# Patient Record
Sex: MALE | Race: WHITE | NOT HISPANIC OR LATINO | Employment: FULL TIME | URBAN - METROPOLITAN AREA
[De-identification: names, ages, dates, MRNs, and addresses within clinical notes are randomized per-mention and may not be internally consistent; named-entity substitution may affect disease eponyms.]

---

## 2017-01-23 ENCOUNTER — OFFICE VISIT (OUTPATIENT)
Dept: MEDICAL GROUP | Facility: MEDICAL CENTER | Age: 37
End: 2017-01-23
Payer: COMMERCIAL

## 2017-01-23 VITALS
TEMPERATURE: 98.4 F | OXYGEN SATURATION: 95 % | HEIGHT: 68 IN | BODY MASS INDEX: 29.83 KG/M2 | WEIGHT: 196.8 LBS | SYSTOLIC BLOOD PRESSURE: 108 MMHG | DIASTOLIC BLOOD PRESSURE: 74 MMHG | HEART RATE: 66 BPM

## 2017-01-23 DIAGNOSIS — R26.89 BALANCE PROBLEM: ICD-10-CM

## 2017-01-23 DIAGNOSIS — Z23 NEED FOR INFLUENZA VACCINATION: ICD-10-CM

## 2017-01-23 DIAGNOSIS — H91.93 BILATERAL HEARING LOSS: ICD-10-CM

## 2017-01-23 DIAGNOSIS — F41.9 ANXIETY: ICD-10-CM

## 2017-01-23 PROCEDURE — 99204 OFFICE O/P NEW MOD 45 MIN: CPT | Mod: 25 | Performed by: FAMILY MEDICINE

## 2017-01-23 PROCEDURE — 90688 IIV4 VACCINE SPLT 0.5 ML IM: CPT | Performed by: FAMILY MEDICINE

## 2017-01-23 ASSESSMENT — PATIENT HEALTH QUESTIONNAIRE - PHQ9: CLINICAL INTERPRETATION OF PHQ2 SCORE: 0

## 2017-01-23 NOTE — MR AVS SNAPSHOT
"        Iggy Malloy   2017 3:00 PM   Office Visit   MRN: [id number]    Department:  [place] [address]   Dept Phone:  [phone]    Description:  Male : 1980   Provider:  Santos Montalvo M.D.           Reason for Visit     Establish Care Enlarged lymph node on (R) neck//needs hearing evaluation >>constant ringing in the ear      Allergies as of 2017     Allergen Noted Reactions    Other Environmental 2017   Runny Nose    severe      You were diagnosed with     Bilateral hearing loss   [940844]       Balance problem   [524685]       Anxiety   [661460]       Need for influenza vaccination   [033783]         Vital Signs     Blood Pressure Pulse Temperature Height Weight Body Mass Index    108/74 mmHg 66 36.9 °C (98.4 °F) 1.727 m (5' 7.99\") 89.268 kg (196 lb 12.8 oz) 29.93 kg/m2    Oxygen Saturation Smoking Status                95% Former Smoker          Basic Information     Date Of Birth Sex Race Ethnicity Preferred Language    1980 Male White Non- English      Your appointments     2017  8:00 AM   Established Patient with Santos Montalvo M.D.   [facility]    [address]  [address]  [facility] [address] [phone]           You will be receiving a confirmation call a few days before your appointment from our automated call confirmation system.              Problem List              ICD-10-CM Priority Class Noted - Resolved    Bilateral hearing loss H91.93   2017 - Present    Balance problem R26.89   2017 - Present    Anxiety F41.9   2017 - Present      Health Maintenance        Date Due Completion Dates    IMM DTaP/Tdap/Td Vaccine (1 - Tdap) 3/17/2000 ---    IMM INFLUENZA (1) 2016 ---            Current Immunizations     Influenza Vaccine Quad Inj (Preserved)  Incomplete      Below and/or attached are the medications your provider expects you to take. Review all of your home " medications and newly ordered medications with your provider and/or pharmacist. Follow medication instructions as directed by your provider and/or pharmacist. Please keep your medication list with you and share with your provider. Update the information when medications are discontinued, doses are changed, or new medications (including over-the-counter products) are added; and carry medication information at all times in the event of emergency situations     Allergies:  OTHER ENVIRONMENTAL - Runny Nose               Medications  Valid as of: January 23, 2017 -  3:47 PM    Generic Name Brand Name Tablet Size Instructions for use    .                 Medicines prescribed today were sent to:     [facility][id number] - [place], [place] - [address]    [address] [place] [address]    Phone: [phone] Fax: [phone]    Open 24 Hours?: No      Medication refill instructions:       If your prescription bottle indicates you have medication refills left, it is not necessary to call your provider’s office. Please contact your pharmacy and they will refill your medication.    If your prescription bottle indicates you do not have any refills left, you may request refills at any time through one of the following ways: The online [employer] system (except Urgent Care), by calling your provider’s office, or by asking your pharmacy to contact your provider’s office with a refill request. Medication refills are processed only during regular business hours and may not be available until the next business day. Your provider may request additional information or to have a follow-up visit with you prior to refilling your medication.   *Please Note: Medication refills are assigned a new Rx number when refilled electronically. Your pharmacy may indicate that no refills were authorized even though a new prescription for the same medication is available at the pharmacy. Please request the medicine by name with the  pharmacy before contacting your provider for a refill.        Referral     A referral request has been sent to our patient care coordination department. Please allow 3-5 business days for us to process this request and contact you either by phone or mail. If you do not hear from us by the 5th business day, please call us at [phone].           Cuil Access Code: [email or web address]  Expires: 2/9/2017  9:08 AM    Cuil  A secure, online tool to manage your health information     [employer]’s Cuil® is a secure, online tool that connects you to your personalized health information from the privacy of your home -- day or night - making it very easy for you to manage your healthcare. Once the activation process is completed, you can even access your medical information using the Cuil triny, which is available for free in the Apple Triny store or Google Play store.     Cuil provides the following levels of access (as shown below):   My Chart Features   [facility] Primary Care Doctor [facility]  Specialists [facility]  Urgent  Care Non-[facility]  Primary Care  Doctor   Email your healthcare team securely and privately 24/7 X X X    Manage appointments: schedule your next appointment; view details of past/upcoming appointments X      Request prescription refills. X      View recent personal medical records, including lab and immunizations X X X X   View health record, including health history, allergies, medications X X X X   Read reports about your outpatient visits, procedures, consult and ER notes X X X X   See your discharge summary, which is a recap of your hospital and/or ER visit that includes your diagnosis, lab results, and care plan. X X       How to register for Cuil:  1. Go to  [email or web address].  2. Click on the Sign Up Now box, which takes you to the New Member Sign Up page. You will need to provide the following information:  a. Enter your Cuil Access Code exactly as it appears at the top of  this page. (You will not need to use this code after you’ve completed the sign-up process. If you do not sign up before the expiration date, you must request a new code.)   b. Enter your date of birth.   c. Enter your home email address.   d. Click Submit, and follow the next screen’s instructions.  3. Create a Kustom Codes ID. This will be your Kustom Codes login ID and cannot be changed, so think of one that is secure and easy to remember.  4. Create a K[employer] Codes password. You can change your password at any time.  5. Enter your Password Reset Question and Answer. This can be used at a later time if you forget your password.   6. Enter your e-mail address. This allows you to receive e-mail notifications when new information is available in Kustom Codes.  7. Click Sign Up. You can now view your health information.    For assistance activating your K[employer] Codes account, call [phone]

## 2017-01-23 NOTE — Clinical Note
[facility]  Santos Montalvo M.D.  [address]  [place] [address]  Fax: [phone] Authorization for Release/Disclosure of Protected Health Information   Name: IGGY MALLOY : 1980 SSN: [id number]   Address[address]  [address]  [place] [address] Phone:    [phone] (home)    I authorize the entity listed below to release/disclose the PHI below to [facility]/Santos Montalvo M.D.   Provider or Entity Name:       Address   City, State, Zip   Phone:      Fax:     Reason for request: continuity of care   Information to be released:    [  ] LAST COLONOSCOPY, including any PATH REPORT [  ] LAST DEXA  [  ] LAST MAMMOGRAM  [  ] LAST PAP [  ] RETINA EXAM REPORT  [  ] IMMUNIZATION RECORDS  [  ] Release all info      [  ] Check here and initial the line next to each item to release ALL health information INCLUDING  _____ Care and treatment for drug and / or alcohol abuse  _____ HIV testing, infection status, or AIDS  _____ Genetic Testing    DATES OF SERVICE OR TIME PERIOD TO BE DISCLOSED: _____________  I understand and acknowledge that:  * This Authorization may be revoked at any time by you in writing, except if your health information has already been used or disclosed.  * Your health information that will be used or disclosed as a result of you signing this authorization could be re-disclosed by the recipient. If this occurs, your re-disclosed health information may no longer be protected by State or Federal laws.  * You may refuse to sign this Authorization. Your refusal will not affect your ability to obtain treatment.  * This Authorization becomes effective upon signing and will  on (date) __________. If no date is indicated, this Authorization will  one (1) year from the signature date.    Name: Iggy Malloy    Signature:     Date: 2017

## 2017-01-27 NOTE — PROGRESS NOTES
Subjective:     Chief Complaint   Patient presents with   • Establish Care     Enlarged lymph node on (R) neck//needs hearing evaluation >>constant ringing in the ear       History of Present Illness:  Iggy Malloy is a 36 y.o. male patient new to [facility] who presents today to have evaluation for bilateral hearing loss as well as to establish primary medical care:    Bilateral hearing loss  Patient will proceed bilateral hearing loss and is equal bilaterally, associated with tinnitus as well as balance problem. Patient does report being exposed to loud sounds in the past.    Of note, patient denies any recent history of head trauma/accident/falls.    ROS negative for dizziness, headaches, blurred vision, ear fullness, sinus congestion, radicular pain down BUE or back, BUE weakness/numbness/paresthesias.    Balance problem  Please see notes from same date of service 01/23/2017 re: Bilateral hearing loss.    Anxiety  Patient with anxiety that he states is fairly well-controlled at present, doesn't feel the need to be referred to psychiatry nor psychology, nor start pharmacologic treatment at present.    ROS is currently NEGATIVE for generalized weakness/fatigue, palpitations, chest pain/pressure, diaphoresis, trembling/tremor, nausea, loose stools, fear of impending doom.      Patient Active Problem List    Diagnosis Date Noted   • Bilateral hearing loss 01/23/2017   • Balance problem 01/23/2017   • Anxiety 01/23/2017       Additional History:   Allergies:    Other environmental     Medications:     No current [facility]-ordered outpatient prescriptions on file.     No current [facility]-ordered facility-administered medications on file.        Past Medical History:   History reviewed. No pertinent past medical history.     Past Surgical History:   History reviewed. No pertinent past surgical history.     Social History:     Social History   Substance Use Topics   • Smoking status: Former Smoker -- 0.25 packs/day for 7  "years     Types: Cigarettes     Quit date: 2016   • Smokeless tobacco: Never Used   • Alcohol Use: No        Family History:     Family Status   Relation Status Death Age   • Mother  61     Alzheimer's   • Father Alive    • Sister Alive    • Maternal Grandmother     • Maternal Grandfather     • Paternal Grandmother     • Paternal Grandfather          Family History   Problem Relation Age of Onset   • Other Mother      Meniere's Disease   • Alzheimer's Disease Mother    • Cancer Mother      Skin Cancer   • No Known Problems Father    • No Known Problems Sister    • Cancer Maternal Grandmother      Throat CA   • Alcohol/Drug Maternal Grandmother      Smoker/Drinker   • Cancer Maternal Grandfather    • Alcohol/Drug Maternal Grandfather      Smoker/Drinker   • Alcohol/Drug Paternal Grandmother      Smoker/Drinker   • Cancer Paternal Grandfather      Stomach Cancer   • Alcohol/Drug Paternal Grandfather      Smoker/Drinker       ROS:     - ROS is NEGATIVE for dizziness, generalized weakness/fatigue, vision/hearing changes, jaw pain/paresthesias, BUE pain/paresthesias/numbness/weakness, chest pain/pressure, palpitations, dyspnea, RUQ abdominal pain, oliguria/anuria, BLE edema.    - Psychiatric/Behavioral: Negative for depression, suicidal/homicidal ideation and memory loss.      - NOTE: All other systems reviewed and are negative, except as in HPI.     Objective:   Physical Exam:    Vitals: Blood pressure 108/74, pulse 66, temperature 36.9 °C (98.4 °F), height 1.727 m (5' 7.99\"), weight 89.268 kg (196 lb 12.8 oz), SpO2 95 %.   BMI: Body mass index is 29.93 kg/(m^2).   General/Constitutional: Vitals as above, Well nourished, well developed male in no acute distress   Head/Eyes:  - Head is grossly normal & atraumatic  - Bilateral conjunctivae clear and not injected, bilateral EOMI, bilateral PERRL   ENT:   - Bilateral external ears grossly normal in appearance, external auditory " canals clear & bilateral TMs visualized with appropriate cone of light reflex, hearing grossly intact, liver testing does not lateralize to either ear, and bilateral Rinne testing reveals that her conduction is greater than bone conduction, although patient reports persistent tinnitus  - External nares normal in appearance and without discharge/bleeding, bilateral turbinates non-erythematous/non-edematous and without discharge/bleeding  - Good dentition,posterior oropharynx without erythema/edema/exudates  Neck: Neck supple, no masses   Respiratory: No respiratory distress, bilateral lungs are clear to auscultation in all lung fields (anterior/lateral/posterior), no wheezing/rhonchi/rales   Cardiovascular: Regular rate and rhythm without murmur/gallops/rubs, distal pulses equal and 2+ bilaterally (radial, posterior tibial), no bilateral lower extremity edema   MSK: Gait grossly normal & not antalgic, no tenderness to percussion of vertebral processes, no CVAT, no bilateral SI joint tenderness   Integumentary: No apparent rashes   Neuro: CN 2-12 tested and grossly intact, strength testing in upper and lower extremities is 5/5 and equal bilaterally, Gross motor movement intact in all 4 extremities, Gross sensation intact to extremities and trunk, Gait grossly normal and not antalgic, negative Romberg, negative Pronator drift   Psych: Judgment grossly appropriate, no apparent depression/anxiety    Assessment and Plan:   1. Bilateral hearing loss  2. Balance problem  Patient with bilateral hearing loss as well as gait imbalance with normal Rivas/Rinne testing.  This is suggestive of sensorineural hearing loss of both ears.  Referral to audiology for further details.   - REFERRAL TO AUDIOLOGY    3. Anxiety  Stable, well-controlled at present.  Will continue to monitor as needed.    4. Need for influenza vaccination   - INFLUENZA VACCINE QUAD INJECTION >3Y(PRESERVED)      PLEASE NOTE: This dictation was created using  voice recognition software. I have made every reasonable attempt to correct obvious errors, but I expect that there are errors of grammar and possibly content that I did not discover before finalizing the note.

## 2017-01-27 NOTE — ASSESSMENT & PLAN NOTE
Patient will proceed bilateral hearing loss and is equal bilaterally, associated with tinnitus as well as balance problem. Patient does report being exposed to loud sounds in the past.    Of note, patient denies any recent history of head trauma/accident/falls.    ROS negative for dizziness, headaches, blurred vision, ear fullness, sinus congestion, radicular pain down BUE or back, BUE weakness/numbness/paresthesias.

## 2017-01-27 NOTE — ASSESSMENT & PLAN NOTE
Patient with anxiety that he states is fairly well-controlled at present, doesn't feel the need to be referred to psychiatry nor psychology, nor start pharmacologic treatment at present.    ROS is currently NEGATIVE for generalized weakness/fatigue, palpitations, chest pain/pressure, diaphoresis, trembling/tremor, nausea, loose stools, fear of impending doom.

## 2017-02-28 ENCOUNTER — APPOINTMENT (OUTPATIENT)
Dept: MEDICAL GROUP | Facility: MEDICAL CENTER | Age: 37
End: 2017-02-28
Payer: COMMERCIAL

## 2017-09-13 ENCOUNTER — OFFICE VISIT (OUTPATIENT)
Dept: MEDICAL GROUP | Facility: MEDICAL CENTER | Age: 37
End: 2017-09-13
Payer: COMMERCIAL

## 2017-09-13 ENCOUNTER — HOSPITAL ENCOUNTER (OUTPATIENT)
Dept: LAB | Facility: MEDICAL CENTER | Age: 37
End: 2017-09-13
Attending: FAMILY MEDICINE
Payer: COMMERCIAL

## 2017-09-13 VITALS
TEMPERATURE: 97.8 F | HEART RATE: 77 BPM | OXYGEN SATURATION: 94 % | DIASTOLIC BLOOD PRESSURE: 68 MMHG | HEIGHT: 67 IN | RESPIRATION RATE: 12 BRPM | SYSTOLIC BLOOD PRESSURE: 108 MMHG | BODY MASS INDEX: 31.42 KG/M2 | WEIGHT: 200.18 LBS

## 2017-09-13 DIAGNOSIS — Z00.00 ROUTINE GENERAL MEDICAL EXAMINATION AT A HEALTH CARE FACILITY: ICD-10-CM

## 2017-09-13 DIAGNOSIS — Z13.1 DIABETES MELLITUS SCREENING: ICD-10-CM

## 2017-09-13 DIAGNOSIS — Z00.00 ANNUAL PHYSICAL EXAM: Primary | ICD-10-CM

## 2017-09-13 DIAGNOSIS — R26.89 BALANCE PROBLEM: ICD-10-CM

## 2017-09-13 DIAGNOSIS — Z13.6 ENCOUNTER FOR LIPID SCREENING FOR CARDIOVASCULAR DISEASE: ICD-10-CM

## 2017-09-13 DIAGNOSIS — Z13.220 ENCOUNTER FOR LIPID SCREENING FOR CARDIOVASCULAR DISEASE: ICD-10-CM

## 2017-09-13 DIAGNOSIS — Z00.00 ANNUAL PHYSICAL EXAM: ICD-10-CM

## 2017-09-13 PROBLEM — H91.93 BILATERAL HEARING LOSS: Status: RESOLVED | Noted: 2017-01-23 | Resolved: 2017-09-13

## 2017-09-13 PROCEDURE — 80061 LIPID PANEL: CPT

## 2017-09-13 PROCEDURE — 80053 COMPREHEN METABOLIC PANEL: CPT

## 2017-09-13 PROCEDURE — 36415 COLL VENOUS BLD VENIPUNCTURE: CPT

## 2017-09-13 PROCEDURE — 99395 PREV VISIT EST AGE 18-39: CPT | Performed by: FAMILY MEDICINE

## 2017-09-13 PROCEDURE — 83036 HEMOGLOBIN GLYCOSYLATED A1C: CPT

## 2017-09-14 LAB
ALBUMIN SERPL BCP-MCNC: 4.2 G/DL (ref 3.2–4.9)
ALBUMIN/GLOB SERPL: 1.8 G/DL
ALP SERPL-CCNC: 65 U/L (ref 30–99)
ALT SERPL-CCNC: 21 U/L (ref 2–50)
ANION GAP SERPL CALC-SCNC: 6 MMOL/L (ref 0–11.9)
AST SERPL-CCNC: 18 U/L (ref 12–45)
BILIRUB SERPL-MCNC: 1.7 MG/DL (ref 0.1–1.5)
BUN SERPL-MCNC: 11 MG/DL (ref 8–22)
CALCIUM SERPL-MCNC: 9.6 MG/DL (ref 8.5–10.5)
CHLORIDE SERPL-SCNC: 105 MMOL/L (ref 96–112)
CHOLEST SERPL-MCNC: 172 MG/DL (ref 100–199)
CO2 SERPL-SCNC: 27 MMOL/L (ref 20–33)
CREAT SERPL-MCNC: 0.96 MG/DL (ref 0.5–1.4)
EST. AVERAGE GLUCOSE BLD GHB EST-MCNC: 120 MG/DL
GFR SERPL CREATININE-BSD FRML MDRD: >60 ML/MIN/1.73 M 2
GLOBULIN SER CALC-MCNC: 2.4 G/DL (ref 1.9–3.5)
GLUCOSE SERPL-MCNC: 81 MG/DL (ref 65–99)
HBA1C MFR BLD: 5.8 % (ref 0–5.6)
HDLC SERPL-MCNC: 33 MG/DL
LDLC SERPL CALC-MCNC: 100 MG/DL
POTASSIUM SERPL-SCNC: 4.2 MMOL/L (ref 3.6–5.5)
PROT SERPL-MCNC: 6.6 G/DL (ref 6–8.2)
SODIUM SERPL-SCNC: 138 MMOL/L (ref 135–145)
TRIGL SERPL-MCNC: 196 MG/DL (ref 0–149)

## 2017-09-17 PROBLEM — R73.03 PREDIABETES: Status: ACTIVE | Noted: 2017-09-17

## 2017-09-17 PROBLEM — E78.2 MIXED DYSLIPIDEMIA: Status: ACTIVE | Noted: 2017-09-17

## 2017-09-21 NOTE — ASSESSMENT & PLAN NOTE
1) patient has tenderness in bilateral ears, as well as alternating ear popping sensation. Patient also has dizziness that sounds like it's provoked by nystagmus as his eyes are tracking movement when he walks into particular room at work. Please ask patient for more details.         2) Patient has had audiologic testing at [facility]'s audiology clinic within the last count, with hearing as type a tympanogram, and hearing within normal limits. Patient was offered VNG testing, and I have discussed with him briefly what that entails.     3) Patient has family medical history for Meniere's disease in his mother.    ROS is NEGATIVE for persistent vertigo/dizziness (it is triggered, as in history above)

## 2017-09-21 NOTE — PROGRESS NOTES
"Subjective:   Chief Complaint/History of Present Illness:  Iggy Malloy is a 37 y.o. male established patient who presents today for annual physical exam and to discuss balance issues, would like referral to ENT if indicated:    Balance problem     1) patient has tenderness in bilateral ears, as well as alternating ear popping sensation. Patient also has dizziness that sounds like it's provoked by nystagmus as his eyes are tracking movement when he walks into particular room at work. Please ask patient for more details.         2) Patient has had audiologic testing at [facility]'s audiology clinic within the last count, with hearing as type a tympanogram, and hearing within normal limits. Patient was offered VNG testing, and I have discussed with him briefly what that entails.     3) Patient has family medical history for Meniere's disease in his mother.    ROS is NEGATIVE for persistent vertigo/dizziness (it is triggered, as in history above)      Patient Active Problem List    Diagnosis Date Noted   • Prediabetes 09/17/2017   • Mixed dyslipidemia 09/17/2017   • Balance problem 01/23/2017   • Anxiety 01/23/2017       Additional History:   Allergies:    Other environmental     Current Medications:     No current outpatient prescriptions on file.     No current facility-administered medications for this visit.         Social History:     Social History   Substance Use Topics   • Smoking status: Former Smoker     Packs/day: 0.25     Years: 7.00     Types: Cigarettes     Quit date: 1/23/2016   • Smokeless tobacco: Never Used   • Alcohol use No       ROS:     - NOTE: All other systems reviewed and are negative, except as in HPI.     Objective:   Physical Exam:    Vitals: Blood pressure 108/68, pulse 77, temperature 36.6 °C (97.8 °F), resp. rate 12, height 1.702 m (5' 7\"), weight 90.8 kg (200 lb 2.8 oz), SpO2 94 %.   BMI: Body mass index is 31.35 kg/m².   General/Constitutional: Vitals as above, Well nourished, well " developed male in no acute distress   Head/Eyes: Head is grossly normal & atraumatic, bilateral conjunctivae clear and not injected, bilateral EOMI, bilateral PERRL   ENT: Bilateral external ears grossly normal in appearance, Hearing grossly intact, External nares normal in appearance and without discharge/bleeding   Respiratory: No respiratory distress, bilateral lungs are clear to ausculation in all lung fields (anterior/lateral/posterior), no wheezing/rhonchi/rales   Cardiovascular: Regular rate and rhythm without murmur/gallops/rubs, distal pulses are intact and equal bilaterally (radial, posterior tibial), no bilateral lower extremity edema   MSK: Gait grossly normal & not antalgic   Integumentary: No apparent rashes   Neuro: CN 2-12 tested and grossly intact, nystagmus not able to be elicited on testing of saccadic gaze & rapid shift of vision from one side to another, Gross motor movement intact in all 4 extremities, Gait grossly normal and not antalgic, no dysmetria on gross testing (finger to nose, heel to shin), no diadochokinesia on gross testing of rapid alternating movements (hands, feet), negative romberg   Psych: Judgment grossly appropriate, no apparent depression/anxiety    Health Maintenance:      - Declines flu vaccine    Assessment and Plan:   1. Annual physical exam  Unknown control of metabolic health. Labs as below to evaluate.  Additionally, see #2 below.   - HEMOGLOBIN A1C; Future   - LIPID PROFILE; Future   - COMP METABOLIC PANEL; Future    2. Balance problem  Uncontrolled, seems to be triggered by target-rich environment with increased visual stimuli that is rapidly moving.  Referral to ENT for further evaluation/management.   - REFERRAL TO ENT    3. Routine general medical examination at a health care facility  4. Diabetes mellitus screening  5. Encounter for lipid screening for cardiovascular disease   - HEMOGLOBIN A1C; Future   - LIPID PROFILE; Future   - COMP METABOLIC PANEL;  Future    RTC: in 1month for labs review.    PLEASE NOTE: This dictation was created using voice recognition software. I have made every reasonable attempt to correct obvious errors, but I expect that there are errors of grammar and possibly content that I did not discover before finalizing the note.

## 2017-10-16 ENCOUNTER — OFFICE VISIT (OUTPATIENT)
Dept: MEDICAL GROUP | Facility: MEDICAL CENTER | Age: 37
End: 2017-10-16
Payer: COMMERCIAL

## 2017-10-16 VITALS
HEART RATE: 75 BPM | DIASTOLIC BLOOD PRESSURE: 74 MMHG | HEIGHT: 67 IN | OXYGEN SATURATION: 95 % | BODY MASS INDEX: 30.61 KG/M2 | SYSTOLIC BLOOD PRESSURE: 120 MMHG | WEIGHT: 195 LBS | RESPIRATION RATE: 12 BRPM | TEMPERATURE: 99.2 F

## 2017-10-16 DIAGNOSIS — M75.81 RIGHT ROTATOR CUFF TENDINITIS: ICD-10-CM

## 2017-10-16 DIAGNOSIS — Z23 NEED FOR INFLUENZA VACCINATION: ICD-10-CM

## 2017-10-16 DIAGNOSIS — E66.9 OBESITY (BMI 30.0-34.9): ICD-10-CM

## 2017-10-16 DIAGNOSIS — R73.03 PREDIABETES: ICD-10-CM

## 2017-10-16 DIAGNOSIS — R26.89 BALANCE PROBLEM: ICD-10-CM

## 2017-10-16 DIAGNOSIS — E78.2 MIXED DYSLIPIDEMIA: ICD-10-CM

## 2017-10-16 PROCEDURE — 99214 OFFICE O/P EST MOD 30 MIN: CPT | Performed by: FAMILY MEDICINE

## 2017-10-16 ASSESSMENT — PAIN SCALES - GENERAL: PAINLEVEL: NO PAIN

## 2017-10-16 NOTE — PATIENT INSTRUCTIONS
[facility]   [address]  [place]  [address]  Phone: [phone]

## 2017-10-16 NOTE — ASSESSMENT & PLAN NOTE
Patient and I discussed recent labs (see below; low HDL) and that ASCVD risk is increased based on most recent lipid panel, current blood pressure (non-hypertensive, without medication), diabetes status (non-diabetic), and smoking status (non-smoker).    Patient and I then discussed necessary dietary changes to make to address dyslipidemia.  Patient verbalized understanding.    ROS is NEGATIVE for dizziness, generalized weakness/fatigue, vision/hearing changes, jaw pain/paresthesias, BUE pain/paresthesias/numbness/weakness, chest pain/pressure, palpitations, dyspnea, RUQ abdominal pain, oliguria/anuria, BLE edema.    Lab Results   Component Value Date/Time    CHOLSTRLTOT 172 09/13/2017 03:58 PM     (H) 09/13/2017 03:58 PM    HDL 33 (A) 09/13/2017 03:58 PM    TRIGLYCERIDE 196 (H) 09/13/2017 03:58 PM

## 2017-10-16 NOTE — ASSESSMENT & PLAN NOTE
Patient states that he is having less of an issue with balance problem. Patient states that part of this is due to reduction of his caffeine, in terms of coffee and soda. 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    Patient will schedule his ENT consultation with [facility].    ROS is NEGATIVE for confusion, altered mentation, word finding difficulty, memory loss, diplopia, visual scotomas, facial droop, dysarthria, dysphagia, hemiplegia, gait instability, new/abnormal paresthesias/numbness

## 2017-10-18 NOTE — ASSESSMENT & PLAN NOTE
Current problem, patient believes is secondary to previous injury he sustained during this time the . This can make a time, impeding his ability to lay on his right side at night, as well as cuddle with his wife. Overall, patient denies any refraction really radicular pain/weakness/numbness, right hand decreased vision, discoordination of right upper extremity, radicular pain down back.

## 2017-10-18 NOTE — ASSESSMENT & PLAN NOTE
Stable, well-controlled.  Currently asymptomatic.    ROS is NEGATIVE for blurred vision, polydipsia, polyuria, diaphoresis, palpitations, fatigue, irritability, flank pain, BLE paresthesias.

## 2017-10-18 NOTE — PROGRESS NOTES
Subjective:   Chief Complaint/History of Present Illness:  Iggy Malloy is a 37 y.o. male established patient who presents today to discuss management of conditions as listed below:    Balance problem  Patient states that he is having less of an issue with balance problem. Patient states that part of this is due to reduction of his caffeine, in terms of coffee and soda. 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    Patient will schedule his ENT consultation with [facility].    ROS is NEGATIVE for confusion, altered mentation, word finding difficulty, memory loss, diplopia, visual scotomas, facial droop, dysarthria, dysphagia, hemiplegia, gait instability, new/abnormal paresthesias/numbness    Mixed dyslipidemia  Patient and I discussed recent labs (see below; low HDL) and that ASCVD risk is increased based on most recent lipid panel, current blood pressure (non-hypertensive, without medication), diabetes status (non-diabetic), and smoking status (non-smoker).    Patient and I then discussed necessary dietary changes to make to address dyslipidemia.  Patient verbalized understanding.    ROS is NEGATIVE for dizziness, generalized weakness/fatigue, vision/hearing changes, jaw pain/paresthesias, BUE pain/paresthesias/numbness/weakness, chest pain/pressure, palpitations, dyspnea, RUQ abdominal pain, oliguria/anuria, BLE edema.    Lab Results   Component Value Date/Time    CHOLSTRLTOT 172 09/13/2017 03:58 PM     (H) 09/13/2017 03:58 PM    HDL 33 (A) 09/13/2017 03:58 PM    TRIGLYCERIDE 196 (H) 09/13/2017 03:58 PM         Right rotator cuff tendinitis  Current problem, patient believes is secondary to previous injury he sustained during this time the . This can make a time, impeding his ability to lay on his right side at night, as well as cuddle with his wife. Overall, patient denies any refraction really radicular pain/weakness/numbness, right hand decreased vision,  "discoordination of right upper extremity, radicular pain down back.    Obesity (BMI 30.0-34.9)  Uncontrolled, however patient has significant muscle mass. Patient and I discussed that he could likely still losing additional 10-15 pounds, and patient is in agreement.    ROS is NEGATIVE for blurred vision, polydipsia, polyuria, diaphoresis, palpitations, fatigue, irritability, flank pain, BLE paresthesias.    ROS is NEGATIVE for: cold or heat intolerance, anxiety/depression, chest pain/pressure, palpitations, hair thickening/coarsening/falling out/thinning, skin changes, diarrhea/constipation, unexpected weight change.    Prediabetes  Stable, well-controlled.  Currently asymptomatic.    ROS is NEGATIVE for blurred vision, polydipsia, polyuria, diaphoresis, palpitations, fatigue, irritability, flank pain, BLE paresthesias.      Patient Active Problem List    Diagnosis Date Noted   • Right rotator cuff tendinitis 10/16/2017   • Obesity (BMI 30.0-34.9) 10/16/2017   • Prediabetes 09/17/2017   • Mixed dyslipidemia 09/17/2017   • Balance problem 01/23/2017   • Anxiety 01/23/2017       Additional History:   Allergies:    Other environmental     Current Medications:     No current outpatient prescriptions on file.     No current facility-administered medications for this visit.         Social History:     Social History   Substance Use Topics   • Smoking status: Former Smoker     Packs/day: 0.25     Years: 7.00     Types: Cigarettes     Quit date: 1/23/2016   • Smokeless tobacco: Never Used   • Alcohol use No       ROS:     - NOTE: All other systems reviewed and are negative, except as in HPI.     Objective:   Physical Exam:    Vitals: Blood pressure 120/74, pulse 75, temperature 37.3 °C (99.2 °F), resp. rate 12, height 1.702 m (5' 7\"), weight 88.5 kg (195 lb), SpO2 95 %.   BMI: Body mass index is 30.54 kg/m².   General/Constitutional: Vitals as above, Well nourished, well developed male in no acute distress   Head/Eyes: Head " is grossly normal & atraumatic, bilateral conjunctivae clear and not injected, bilateral EOMI, bilateral PERRL   ENT: Bilateral external ears grossly normal in appearance, Hearing grossly intact, External nares normal in appearance and without discharge/bleeding   Respiratory: No respiratory distress, bilateral lungs are clear to ausculation in all lung fields (anterior/lateral/posterior), no wheezing/rhonchi/rales   Cardiovascular: Regular rate and rhythm without murmur/gallops/rubs, distal pulses are intact and equal bilaterally (radial, posterior tibial), no bilateral lower extremity edema   MSK: Gait grossly normal & not antalgic, POSITIVE exam of right rotator cuff strain/sprain [tenderness of rotator cuff on: internal & external rotation against resistance, abduction/adduction against resistance, subscapularis liftoff test], otherwise NEGATIVE exam of bilateral upper extremities (shoulder impingement [empty can, Hawkin's, Neer's], tenderness on abduction and adduction against resistance, strength testing 5/5 and equal bilaterally of biceps/triceps/)   Integumentary: No apparent rashes   Psych: Judgment grossly appropriate, no apparent depression/anxiety    Health Maintenance:      - Declines flu vaccination    Imaging/Labs:      - 09/14/17 -- Prediabetes (A1c 5.8%), mixed DLD (HDL 33, , TChol 172, ), CMP WNL    Assessment and Plan:   1. Balance problem  Uncontrolled.  Patient encouraged to follow-up with ENT Referral.    2. Right rotator cuff tendinitis  Uncontrolled.  Referral to PT for further evaluation/management.   - REFERRAL TO PHYSICAL THERAPY Reason for Therapy: Eval/Treat/Report    3. Mixed dyslipidemia  Uncontrolled.  Patient and I discussed the importance of lifestyle changes, with particular emphasis on plant-based nutrition (for the purposes of weight loss, general health, DLD, prediabetes), as well as cardiovascular exercise, proper sleep, and stress management.  This discussion  is briefly summarized in the patient instruction section below.  Patient verbalized understanding.    4. Prediabetes  Uncontrolled.  See lifestyle changes as in #3 above.    5. Obesity (BMI 30.0-34.9)  Uncontrolled.  Patient to work on further weight loss.   - Patient identified as having weight management issue.  Appropriate orders and counseling given.    6. Need for influenza vaccination  Uncontrolled. However patient declines vaccination present time. We discussed good hand hygiene practices as well as general infection control practices that patient can perform to decrease risk of influenza infection.        RTC: in 2 months for follow-up of R shoulder pain.    PLEASE NOTE: This dictation was created using voice recognition software. I have made every reasonable attempt to correct obvious errors, but I expect that there are errors of grammar and possibly content that I did not discover before finalizing the note.

## 2017-11-29 ENCOUNTER — PATIENT MESSAGE (OUTPATIENT)
Dept: MEDICAL GROUP | Facility: MEDICAL CENTER | Age: 37
End: 2017-11-29

## 2017-11-29 DIAGNOSIS — R26.89 BALANCE PROBLEM: ICD-10-CM

## 2017-11-29 NOTE — TELEPHONE ENCOUNTER
From: Iggy Malloy  To: Santos Montalvo M.D.  Sent: 11/29/2017 8:21 AM PST  Subject: Non-Urgent Medical Question     Hi, I wanted to know if I can get another referral for [facility], I waited too long and they don't have it anymore, was finally able to get there next week if possible,        Thanks, Bin Malloy

## 2017-12-05 ENCOUNTER — PATIENT MESSAGE (OUTPATIENT)
Dept: MEDICAL GROUP | Facility: MEDICAL CENTER | Age: 37
End: 2017-12-05

## 2017-12-07 NOTE — TELEPHONE ENCOUNTER
From: Iggy Malloy  To: Santos Montalvo M.D.  Sent: 12/5/2017 9:51 PM PST  Subject: Non-Urgent Medical Question    Hi Dr. Montalvo,     I have called [facility] several times, they don't answer, in which I have left two messages and never received a call back, or, if they do answer, transfer me to a voicemail inbox, is there any other place I can go? Thanks,      Bin Malloy

## 2017-12-11 ENCOUNTER — PATIENT MESSAGE (OUTPATIENT)
Dept: MEDICAL GROUP | Facility: MEDICAL CENTER | Age: 37
End: 2017-12-11

## 2017-12-18 ENCOUNTER — APPOINTMENT (OUTPATIENT)
Dept: MEDICAL GROUP | Facility: MEDICAL CENTER | Age: 37
End: 2017-12-18
Payer: COMMERCIAL

## 2018-01-15 ENCOUNTER — APPOINTMENT (OUTPATIENT)
Dept: MEDICAL GROUP | Facility: MEDICAL CENTER | Age: 38
End: 2018-01-15
Payer: COMMERCIAL

## 2018-01-30 ENCOUNTER — OFFICE VISIT (OUTPATIENT)
Dept: MEDICAL GROUP | Facility: MEDICAL CENTER | Age: 38
End: 2018-01-30
Payer: COMMERCIAL

## 2018-01-30 VITALS
HEART RATE: 99 BPM | DIASTOLIC BLOOD PRESSURE: 60 MMHG | OXYGEN SATURATION: 95 % | SYSTOLIC BLOOD PRESSURE: 98 MMHG | TEMPERATURE: 98.5 F | HEIGHT: 67 IN | BODY MASS INDEX: 31.83 KG/M2 | WEIGHT: 202.8 LBS

## 2018-01-30 DIAGNOSIS — R26.89 BALANCE PROBLEM: ICD-10-CM

## 2018-01-30 DIAGNOSIS — B95.8 STAPHYLOCOCCAL INFECTION OF SKIN: ICD-10-CM

## 2018-01-30 DIAGNOSIS — L08.9 STAPHYLOCOCCAL INFECTION OF SKIN: ICD-10-CM

## 2018-01-30 DIAGNOSIS — R73.03 PREDIABETES: ICD-10-CM

## 2018-01-30 DIAGNOSIS — E78.2 MIXED DYSLIPIDEMIA: ICD-10-CM

## 2018-01-30 PROCEDURE — 99214 OFFICE O/P EST MOD 30 MIN: CPT | Performed by: FAMILY MEDICINE

## 2018-01-30 RX ORDER — CEPHALEXIN 250 MG/1
500 CAPSULE ORAL 2 TIMES DAILY
Qty: 28 CAP | Refills: 0 | Status: SHIPPED | OUTPATIENT
Start: 2018-01-30 | End: 2018-02-06

## 2018-01-30 RX ORDER — DIAZEPAM 2 MG/1
TABLET ORAL
COMMUNITY
Start: 2018-01-10 | End: 2018-07-12

## 2018-01-30 ASSESSMENT — PATIENT HEALTH QUESTIONNAIRE - PHQ9: CLINICAL INTERPRETATION OF PHQ2 SCORE: 0

## 2018-02-03 NOTE — ASSESSMENT & PLAN NOTE
Chronic, stable, well-controlled, he believes that he is improving his diet over time.      ROS is NEGATIVE for blurred vision, polydipsia, polyuria, diaphoresis, palpitations, fatigue, irritability, flank pain, BLE paresthesias.

## 2018-02-03 NOTE — ASSESSMENT & PLAN NOTE
Chronic, unknown control, patient is changing his diet over time.    ROS is NEGATIVE for dizziness, generalized weakness/fatigue, cold sweats, dizziness,  vision/hearing changes, jaw pain/paresthesias, BUE pain/paresthesias/numbness/weakness, chest pain/pressure, palpitations, dyspnea, nausea, RUQ abdominal pain, oliguria/anuria, BLE edema.

## 2018-02-03 NOTE — PROGRESS NOTES
Subjective:   Chief Complaint/History of Present Illness:  Iggy Malloy is a 37 y.o. male established patient who presents today to discuss the following medical conditions:    Staphylococcal infection of skin  Patient has a new rash/skin infection on the right side of his face in his beard area/region.  Patient says that this has been pruritic, and has been spreading in distribution to have more lesions and be larger sizes.    Of note, since patient states that he practices [employer], and believes that he may have incurred this infection as results of this exercise.     ROS is negative for fevers, chills, purulent or bloody drainage.    Balance problem  Chronic, uncontrolled, however improving. Review of records reveals that he went to [facility] earlier this month, who informed him that he may have Ménière's disease. Therefore, patient has been placed on restricted salt diet. Patient states that he has been feeling less imbalance since that time, and verbalizes understanding that it is difficult to diagnose Ménière's disease, and that this is more of a disease of exclusion or a clinical diagnosis.    ROS is NEGATIVE for confusion, altered mentation, word finding difficulty, memory loss, diplopia, visual scotomas, facial droop, dysarthria, dysphagia, hemiplegia, gait instability, new/abnormal paresthesias/numbness.    Mixed dyslipidemia  Chronic, unknown control, patient is changing his diet over time.    ROS is NEGATIVE for dizziness, generalized weakness/fatigue, cold sweats, dizziness,  vision/hearing changes, jaw pain/paresthesias, BUE pain/paresthesias/numbness/weakness, chest pain/pressure, palpitations, dyspnea, nausea, RUQ abdominal pain, oliguria/anuria, BLE edema.    Prediabetes  Chronic, stable, well-controlled, he believes that he is improving his diet over time.      ROS is NEGATIVE for blurred vision, polydipsia, polyuria, diaphoresis, palpitations, fatigue, irritability, flank pain, BLE  "paresthesias.      Patient Active Problem List    Diagnosis Date Noted   • Staphylococcal infection of skin 01/30/2018   • Right rotator cuff tendinitis 10/16/2017   • Obesity (BMI 30.0-34.9) 10/16/2017   • Prediabetes 09/17/2017   • Mixed dyslipidemia 09/17/2017   • Balance problem 01/23/2017   • Anxiety 01/23/2017       Additional History:   Allergies:    Other environmental     Current Medications:     Current Outpatient Prescriptions   Medication Sig Dispense Refill   • diazepam (VALIUM) 2 MG Tab      • cephALEXin (KEFLEX) 250 MG Cap Take 2 Caps by mouth 2 times a day for 7 days. 28 Cap 0     No current facility-administered medications for this visit.         Social History:     Social History   Substance Use Topics   • Smoking status: Former Smoker     Packs/day: 0.25     Years: 7.00     Types: Cigarettes     Quit date: 1/23/2016   • Smokeless tobacco: Never Used   • Alcohol use No       ROS:     - NOTE: All other systems reviewed and are negative, except as in HPI.     Objective:   Physical Exam:   Vitals: Blood pressure (!) 98/60, pulse 99, temperature 36.9 °C (98.5 °F), height 1.702 m (5' 7\"), weight 92 kg (202 lb 12.8 oz), SpO2 95 %.   BMI: Body mass index is 31.76 kg/m².   General/Constitutional: Vitals as above, Well nourished, well developed male in no acute distress   Head/Eyes: Head is grossly normal & atraumatic, bilateral conjunctivae clear and not injected, bilateral EOMI, bilateral PERRL   ENT: Bilateral external ears grossly normal in appearance, Hearing grossly intact, External nares normal in appearance and without discharge/bleeding   Respiratory: No respiratory distress, bilateral lungs are clear to ausculation in all lung fields (anterior/lateral/posterior), no wheezing/rhonchi/rales   Cardiovascular: Regular rate and rhythm without murmur/gallops/rubs, distal pulses are intact and equal bilaterally (radial, posterior tibial), no bilateral lower extremity edema   MSK: Gait grossly normal & " "not antalgic   Integumentary: Stage 1-stage 2 ulcerations over the right side of his cheek/jawline without purulent drainage that are \"honey-colored,\" No apparent rashes   Psych: Judgment grossly appropriate, no apparent depression/anxiety    Health Maintenance:     - Not addressed at this visit    Imaging/Labs:     - Not addressed at this visit    Assessment and Plan:   1. Staphylococcal infection of skin  Acute, new problem, patient to take Keflex to treat this condition.   - cephALEXin (KEFLEX) 250 MG Cap; Take 2 Caps by mouth 2 times a day for 7 days.  Dispense: 28 Cap; Refill: 0    2. Balance problem  Chronic, uncontrolled, patient to follow-up with ENT.  Continue low-salt diet.    3. Mixed dyslipidemia  Chronic, uncontrolled, continue with lifestyle changes. Evaluate with labs as below.   - LIPID PROFILE; Future    4. Pred iabetes  Chronic, uncontrolled, continue with lifestyle changes. Evaluate with labs as below.   - HEMOGLOBIN A1C; Future        RTC: in 09/2018 re: Annual Medical exam.    PLEASE NOTE: This dictation was created using voice recognition software. I have made every reasonable attempt to correct obvious errors, but I expect that there are errors of grammar and possibly content that I did not discover before finalizing the note.  "

## 2018-02-03 NOTE — ASSESSMENT & PLAN NOTE
Patient has a new rash/skin infection on the right side of his face in his beard area/region.  Patient says that this has been pruritic, and has been spreading in distribution to have more lesions and be larger sizes.    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     ROS is negative for fevers, chills, purulent or bloody drainage.

## 2018-02-03 NOTE — ASSESSMENT & PLAN NOTE
Chronic, uncontrolled, however improving. Review of records reveals that he went to [facility] earlier this month, who informed him that he may have Ménière's disease. Therefore, patient has been placed on restricted salt diet. Patient states that he has been feeling less imbalance since that time, and verbalizes understanding that it is difficult to diagnose Ménière's disease, and that this is more of a disease of exclusion or a clinical diagnosis.    ROS is NEGATIVE for confusion, altered mentation, word finding difficulty, memory loss, diplopia, visual scotomas, facial droop, dysarthria, dysphagia, hemiplegia, gait instability, new/abnormal paresthesias/numbness.

## 2018-02-13 ENCOUNTER — OFFICE VISIT (OUTPATIENT)
Dept: MEDICAL GROUP | Facility: MEDICAL CENTER | Age: 38
End: 2018-02-13
Payer: COMMERCIAL

## 2018-02-13 VITALS
OXYGEN SATURATION: 95 % | SYSTOLIC BLOOD PRESSURE: 110 MMHG | HEART RATE: 71 BPM | WEIGHT: 207.23 LBS | BODY MASS INDEX: 32.53 KG/M2 | TEMPERATURE: 97.8 F | HEIGHT: 67 IN | DIASTOLIC BLOOD PRESSURE: 72 MMHG

## 2018-02-13 DIAGNOSIS — L08.9 SKIN INFECTION: ICD-10-CM

## 2018-02-13 PROCEDURE — 99214 OFFICE O/P EST MOD 30 MIN: CPT | Performed by: INTERNAL MEDICINE

## 2018-02-13 RX ORDER — CLINDAMYCIN HYDROCHLORIDE 300 MG/1
300 CAPSULE ORAL 3 TIMES DAILY
Qty: 30 CAP | Refills: 0 | Status: SHIPPED | OUTPATIENT
Start: 2018-02-13 | End: 2018-03-28

## 2018-02-13 NOTE — PROGRESS NOTES
CHIEF COMPLAINT  Chief Complaint   Patient presents with   • Wound Infection     inection on skin / Face     HPI  Patient is a 37 y.o. male patient who presents today for the following     Skin infection  The patient had episode of skin infection 3 weeks ago, on the right side of the face.     He was evaluated by Dr Montalvo in our office on 1/30/18, note was reviewed:  - Diagnosed as staph infection  - Given Keflex by mouth for 7 days    Skin infection, resolved and came back after a few days and gradually worsening, on the same place.     Risk: wrestling     Denies:  - Fever, chills  - Pain  - Numbness, tingling    Reviewed PMH, PSH, FH, SH, ALL, HCM/IMM, no changes  Reviewed MEDS, no changes    Patient Active Problem List    Diagnosis Date Noted   • Staphylococcal infection of skin 01/30/2018   • Right rotator cuff tendinitis 10/16/2017   • Obesity (BMI 30.0-34.9) 10/16/2017   • Prediabetes 09/17/2017   • Mixed dyslipidemia 09/17/2017   • Balance problem 01/23/2017   • Anxiety 01/23/2017     CURRENT MEDICATIONS  Current Outpatient Prescriptions   Medication Sig Dispense Refill   • clindamycin (CLEOCIN) 300 MG Cap Take 1 Cap by mouth 3 times a day. 30 Cap 0   • mupirocin (BACTROBAN) 2 % Ointment Apply 1 Application to affected area(s) every day. 1 Tube 1   • diazepam (VALIUM) 2 MG Tab        No current facility-administered medications for this visit.      ALLERGIES  Allergies: Other environmental  PAST MEDICAL HISTORY  No past medical history on file.  SURGICAL HISTORY  He  has no past surgical history on file.  SOCIAL HISTORY  Social History   Substance Use Topics   • Smoking status: Former Smoker     Packs/day: 0.25     Years: 7.00     Types: Cigarettes     Quit date: 1/23/2016   • Smokeless tobacco: Never Used   • Alcohol use No     Social History     Social History Narrative   • No narrative on file     FAMILY HISTORY  Family History   Problem Relation Age of Onset   • Other Mother      Meniere's Disease   •  "Alzheimer's Disease Mother    • Cancer Mother      Skin Cancer   • No Known Problems Father    • No Known Problems Sister    • Cancer Maternal Grandmother      Throat CA   • Alcohol/Drug Maternal Grandmother      Smoker/Drinker   • Cancer Maternal Grandfather    • Alcohol/Drug Maternal Grandfather      Smoker/Drinker   • Alcohol/Drug Paternal Grandmother      Smoker/Drinker   • Cancer Paternal Grandfather      Stomach Cancer   • Alcohol/Drug Paternal Grandfather      Smoker/Drinker     Family Status   Relation Status   • Mother  at age 61    Alzheimer's   • Father Alive   • Sister Alive   • Maternal Grandmother    • Maternal Grandfather    • Paternal Grandmother    • Paternal Grandfather      ROS   Constitutional: Negative for fever, chills.  HENT: Negative for congestion, sore throat.  Eyes: Negative for blurred vision.   Respiratory: Negative for cough, shortness of breath.  Cardiovascular: Negative for chest pain, palpitations.   Gastrointestinal: Negative for heartburn, nausea, abdominal pain.   Musculoskeletal: Negative for muscle pain..   Skin: Negative for rash and itching.   Neuro: Negative for weakness and headache.   Endo/Heme/Allergies: Does not bruise/bleed easily.   Psychiatric/Behavioral: Negative for depression    PHYSICAL EXAM   Blood pressure 110/72, pulse 71, temperature 36.6 °C (97.8 °F), height 1.702 m (5' 7\"), weight 94 kg (207 lb 3.7 oz), SpO2 95 %. Body mass index is 32.46 kg/m².  General:  NAD, well appearing  HEENT:   NC/AT, PERRLA, EOMI, TMs are clear. Oropharyngeal mucosa is pink,  without lesions;  no cervical / supraclavicular  lymphadenopathy, no thyromegaly.    Cardiovascular: RRR.   No m/r/g. No carotid bruits .      Lungs:   CTAB, no w/r/r, no respiratory distress.  Abdomen: Soft, NT/ND + BS; no suprapubic tenderness; no masses or hepatosplenomegaly.  Extremities:  2+ DP and radial pulses bilaterally.  No c/c/e.   Skin:  Warm, dry.  "   Erythematosus, nontender rash on the right side of the face.  Neurologic: Alert & oriented x 3. No focal deficits.  Psychiatric:  Affect normal, mood normal, judgment normal.    LABS     Labs are reviewed and discussed with a patient  Lab Results   Component Value Date/Time    CHOLSTRLTOT 172 09/13/2017 03:58 PM     (H) 09/13/2017 03:58 PM    HDL 33 (A) 09/13/2017 03:58 PM    TRIGLYCERIDE 196 (H) 09/13/2017 03:58 PM       Lab Results   Component Value Date/Time    SODIUM 138 09/13/2017 03:58 PM    POTASSIUM 4.2 09/13/2017 03:58 PM    CHLORIDE 105 09/13/2017 03:58 PM    CO2 27 09/13/2017 03:58 PM    GLUCOSE 81 09/13/2017 03:58 PM    BUN 11 09/13/2017 03:58 PM    CREATININE 0.96 09/13/2017 03:58 PM     Lab Results   Component Value Date/Time    ALKPHOSPHAT 65 09/13/2017 03:58 PM    ASTSGOT 18 09/13/2017 03:58 PM    ALTSGPT 21 09/13/2017 03:58 PM    TBILIRUBIN 1.7 (H) 09/13/2017 03:58 PM      Lab Results   Component Value Date/Time    HBA1C 5.8 (H) 09/13/2017 03:58 PM     IMAGING     None    ASSESMENT AND PLAN        1. Skin infection  - clindamycin (CLEOCIN) 300 MG Cap; Take 1 Cap by mouth 3 times a day.  Dispense: 30 Cap; Refill: 0  - mupirocin (BACTROBAN) 2 % Ointment; Apply 1 Application to affected area(s) every day.  Dispense: 1 Tube; Refill: 1    Advised to take abx after a meal.   Side effects of abx use discussed with a patient. Advised to stop abx and call if develop any side effect, including diarrhea.     Advised sanitation of the training/exercise area, hygiene practices.    Counseling:   - Smoking:  Nonsmoker    Followup: Return if symptoms worsen or fail to improve.    All questions are answered.    Please note that this dictation was created using voice recognition software, and that there might be errors of melecio and possibly content.

## 2018-02-22 ENCOUNTER — OFFICE VISIT (OUTPATIENT)
Dept: MEDICAL GROUP | Facility: MEDICAL CENTER | Age: 38
End: 2018-02-22
Payer: COMMERCIAL

## 2018-02-22 VITALS
HEART RATE: 75 BPM | BODY MASS INDEX: 32.65 KG/M2 | DIASTOLIC BLOOD PRESSURE: 76 MMHG | OXYGEN SATURATION: 95 % | WEIGHT: 208 LBS | TEMPERATURE: 97.8 F | SYSTOLIC BLOOD PRESSURE: 108 MMHG | HEIGHT: 67 IN

## 2018-02-22 DIAGNOSIS — L08.9 SKIN INFECTION: ICD-10-CM

## 2018-02-22 DIAGNOSIS — B00.9 HSV INFECTION: ICD-10-CM

## 2018-02-22 PROCEDURE — 99214 OFFICE O/P EST MOD 30 MIN: CPT | Performed by: INTERNAL MEDICINE

## 2018-02-22 RX ORDER — VALACYCLOVIR HYDROCHLORIDE 1 G/1
1000 TABLET, FILM COATED ORAL 2 TIMES DAILY
Qty: 20 TAB | Refills: 2 | Status: SHIPPED | OUTPATIENT
Start: 2018-02-22 | End: 2018-07-12

## 2018-02-23 NOTE — PROGRESS NOTES
CHIEF COMPLAINT  Chief Complaint   Patient presents with   • Medication Refill   • Labs Only     need Lab Test   HSV    HPI  Patient is a 37 y.o. male patient who presents today for the following     HSV infection  The patient has history of cold sores.   No current lesions.   He requested medication for HSV infection.     Skin infection  Interval course  The patient was seen one week ago for skin infection on the right side of the face/neck.   He was given clindamycin by mouth and mupirocin cream;  - Skin infection is almost resolved  - He still here.  Clindamycin until tomorrow evening.     Background:  Onset: 3-4 weeks ago, on the right side of the face.   Initial abx: Keflex by mouth for 7 days  Skin infection, resolved and came back after a few days and gradually worsening, on the same place.     Risk: wrestling   Denies:  - Fever, chills  - Pain  - Numbness, tingling    Reviewed PMH, PSH, FH, SH, ALL, HCM/IMM, no changes  Reviewed MEDS, no changes    Patient Active Problem List    Diagnosis Date Noted   • Staphylococcal infection of skin 01/30/2018   • Right rotator cuff tendinitis 10/16/2017   • Obesity (BMI 30.0-34.9) 10/16/2017   • Prediabetes 09/17/2017   • Mixed dyslipidemia 09/17/2017   • Balance problem 01/23/2017   • Anxiety 01/23/2017     CURRENT MEDICATIONS  Current Outpatient Prescriptions   Medication Sig Dispense Refill   • valacyclovir (VALTREX) 1 GM Tab Take 1 Tab by mouth 2 times a day. 20 Tab 2   • clindamycin (CLEOCIN) 300 MG Cap Take 1 Cap by mouth 3 times a day. 30 Cap 0   • mupirocin (BACTROBAN) 2 % Ointment Apply 1 Application to affected area(s) every day. 1 Tube 1   • diazepam (VALIUM) 2 MG Tab        No current facility-administered medications for this visit.      ALLERGIES  Allergies: Other environmental  PAST MEDICAL HISTORY  No past medical history on file.  SURGICAL HISTORY  He  has no past surgical history on file.  SOCIAL HISTORY  Social History   Substance Use Topics   •  "Smoking status: Former Smoker     Packs/day: 0.25     Years: 7.00     Types: Cigarettes     Quit date: 2016   • Smokeless tobacco: Never Used   • Alcohol use No     Social History     Social History Narrative   • No narrative on file     FAMILY HISTORY  Family History   Problem Relation Age of Onset   • Other Mother      Meniere's Disease   • Alzheimer's Disease Mother    • Cancer Mother      Skin Cancer   • No Known Problems Father    • No Known Problems Sister    • Cancer Maternal Grandmother      Throat CA   • Alcohol/Drug Maternal Grandmother      Smoker/Drinker   • Cancer Maternal Grandfather    • Alcohol/Drug Maternal Grandfather      Smoker/Drinker   • Alcohol/Drug Paternal Grandmother      Smoker/Drinker   • Cancer Paternal Grandfather      Stomach Cancer   • Alcohol/Drug Paternal Grandfather      Smoker/Drinker     Family Status   Relation Status   • Mother  at age 61    Alzheimer's   • Father Alive   • Sister Alive   • Maternal Grandmother    • Maternal Grandfather    • Paternal Grandmother    • Paternal Grandfather      ROS   Constitutional: Negative for fever, chills.  HENT: Negative for congestion.  Eyes: Negative for blurred vision.   Respiratory: Negative for cough.  Cardiovascular: Negative for chest pain, palpitations.   Gastrointestinal: Negative for heartburn, nausea, abdominal pain.   Musculoskeletal: Negative for significant myalgias.   Skin: as above.   ID: as above.   Neuro: Negative for headaches.   Endo/Heme/Allergies: Does not bruise/bleed easily.   Psychiatric/Behavioral: Negative for depression    PHYSICAL EXAM   Blood pressure 108/76, pulse 75, temperature 36.6 °C (97.8 °F), height 1.702 m (5' 7\"), weight 94.3 kg (208 lb), SpO2 95 %. Body mass index is 32.58 kg/m².  General:  NAD, well appearing  HEENT:   NC/AT, PERRLA, EOMI, TMs are clear. Oropharyngeal mucosa is pink,  without lesions;  no cervical / supraclavicular  lymphadenopathy, no " thyromegaly.    Cardiovascular: RRR.   No m/r/g. No carotid bruits .      Lungs:   CTAB, no w/r/r, no respiratory distress.  Abdomen: Soft, NT/ND + BS; no suprapubic tenderness; no masses or hepatosplenomegaly.  Extremities:  2+ DP and radial pulses bilaterally.  No c/c/e.   Skin:  Warm, dry.    Resolving skin infection/erythema on the right side of the face/neck.  Neurologic: Alert & oriented x 3. No focal deficits.  Psychiatric:  Affect normal, mood normal, judgment normal.    LABS     None    IMAGING     None    ASSESMENT AND PLAN        1. HSV infection  - valacyclovir (VALTREX) 1 GM Tab; Take 1 Tab by mouth 2 times a day.  Dispense: 20 Tab; Refill: 2    - Reviewed effects and side effects of medication, the patient verbalized understanding     2. Skin infection  Improved, almost resolved, finish oral and topical antibiotic treatment.    Counseling:   - Smoking:  Nonsmoker    Followup: Return if symptoms worsen or fail to improve.    All questions are answered.    Please note that this dictation was created using voice recognition software, and that there might be errors of melecio and possibly content.

## 2018-03-28 ENCOUNTER — OFFICE VISIT (OUTPATIENT)
Dept: MEDICAL GROUP | Facility: MEDICAL CENTER | Age: 38
End: 2018-03-28
Payer: COMMERCIAL

## 2018-03-28 VITALS
DIASTOLIC BLOOD PRESSURE: 76 MMHG | TEMPERATURE: 99 F | OXYGEN SATURATION: 95 % | BODY MASS INDEX: 32.58 KG/M2 | HEIGHT: 67 IN | SYSTOLIC BLOOD PRESSURE: 122 MMHG | HEART RATE: 101 BPM | WEIGHT: 207.6 LBS

## 2018-03-28 DIAGNOSIS — E66.9 OBESITY (BMI 30.0-34.9): ICD-10-CM

## 2018-03-28 DIAGNOSIS — L08.9 SKIN INFECTION: ICD-10-CM

## 2018-03-28 DIAGNOSIS — J30.1 CHRONIC SEASONAL ALLERGIC RHINITIS DUE TO POLLEN: ICD-10-CM

## 2018-03-28 PROCEDURE — 99214 OFFICE O/P EST MOD 30 MIN: CPT | Performed by: INTERNAL MEDICINE

## 2018-03-28 RX ORDER — TRIAMCINOLONE ACETONIDE 40 MG/ML
40 INJECTION, SUSPENSION INTRA-ARTICULAR; INTRAMUSCULAR ONCE
Status: COMPLETED | OUTPATIENT
Start: 2018-03-28 | End: 2018-03-28

## 2018-03-28 RX ADMIN — TRIAMCINOLONE ACETONIDE 40 MG: 40 INJECTION, SUSPENSION INTRA-ARTICULAR; INTRAMUSCULAR at 14:16

## 2018-03-28 NOTE — PROGRESS NOTES
CHIEF COMPLAINT  Chief Complaint   Patient presents with   • Rash     Rash on face x 2 months     HPI  Patient is a 38 y.o. male patient who presents today for the following    Skin infection  The patient has had recurrent skin infection on the right side of the neck since January 2018, for the last 2 months;  - The femoral infection was on and off, responded well to topical and by mouth antibiotics.   He was seen by 2 hour providers, Dr. Montalvo, on 1/30/18, and myself on 2/13/18.  Previous treatment:  1. Keflex  2. Clindamycin  3. Mupirocin ointment   - Given Keflex by mouth for 7 days     Soon after topical antibiotic was stopped, developed again slight rash on the right side of the face.     Risk: wrestling      Denies:  - Fever, chills  - Pain  - Numbness, tingling    Seasonal allergies  Onset: mid 20'   Symptoms: Nasal congestion and sneezing  Seasons: Spring and fall  Treated in the past with OTC antihistamine.  Requested kenalog injection.   FH: father    Reviewed PMH, PSH, FH, SH, ALL, HCM/IMM, no changes  Reviewed MEDS, no changes    Patient Active Problem List    Diagnosis Date Noted   • Staphylococcal infection of skin 01/30/2018   • Right rotator cuff tendinitis 10/16/2017   • Obesity (BMI 30.0-34.9) 10/16/2017   • Prediabetes 09/17/2017   • Mixed dyslipidemia 09/17/2017   • Balance problem 01/23/2017   • Anxiety 01/23/2017     CURRENT MEDICATIONS  Current Outpatient Prescriptions   Medication Sig Dispense Refill   • valacyclovir (VALTREX) 1 GM Tab Take 1 Tab by mouth 2 times a day. 20 Tab 2   • clindamycin (CLEOCIN) 300 MG Cap Take 1 Cap by mouth 3 times a day. 30 Cap 0   • mupirocin (BACTROBAN) 2 % Ointment Apply 1 Application to affected area(s) every day. 1 Tube 1   • diazepam (VALIUM) 2 MG Tab        No current facility-administered medications for this visit.      ALLERGIES  Allergies: Other environmental  PAST MEDICAL HISTORY  No past medical history on file.  SURGICAL HISTORY  He  has no past  "surgical history on file.  SOCIAL HISTORY  Social History   Substance Use Topics   • Smoking status: Former Smoker     Packs/day: 0.25     Years: 7.00     Types: Cigarettes     Quit date: 2016   • Smokeless tobacco: Never Used   • Alcohol use No     Social History     Social History Narrative   • No narrative on file     FAMILY HISTORY  Family History   Problem Relation Age of Onset   • Other Mother      Meniere's Disease   • Alzheimer's Disease Mother    • Cancer Mother      Skin Cancer   • No Known Problems Father    • No Known Problems Sister    • Cancer Maternal Grandmother      Throat CA   • Alcohol/Drug Maternal Grandmother      Smoker/Drinker   • Cancer Maternal Grandfather    • Alcohol/Drug Maternal Grandfather      Smoker/Drinker   • Alcohol/Drug Paternal Grandmother      Smoker/Drinker   • Cancer Paternal Grandfather      Stomach Cancer   • Alcohol/Drug Paternal Grandfather      Smoker/Drinker     Family Status   Relation Status   • Mother  at age 61    Alzheimer's   • Father Alive   • Sister Alive   • Maternal Grandmother    • Maternal Grandfather    • Paternal Grandmother    • Paternal Grandfather      ROS   Constitutional: Negative for fever, chills.  HENT: As above.  Eyes: Negative for blurred vision.   Respiratory: Negative for cough, shortness of breath.  Cardiovascular: Negative for chest pain, palpitations.   Gastrointestinal: Negative for heartburn, nausea, abdominal pain.   Genitourinary: Negative for dysuria.  Musculoskeletal: Negative for significant myalgias, back pain and joint pain.   Skin: As above.  Neuro: Negative for dizziness, weakness and headaches.   Endo/Heme/Allergies: Does not bruise/bleed easily.   Psychiatric/Behavioral: Negative for depression, anxiety    PHYSICAL EXAM   Blood pressure 122/76, pulse (!) 101, temperature 37.2 °C (99 °F), height 1.702 m (5' 7\"), weight 94.2 kg (207 lb 9.6 oz), SpO2 95 %. Body mass index is 32.51 " kg/m².  General:  NAD, well appearing  HEENT:   NC/AT, PERRLA, EOMI, TMs are clear.  Nasal mucosa is erythematous, pharyngeal mucosa is pink,  without lesions;  no cervical / supraclavicular  lymphadenopathy, no thyromegaly.    Cardiovascular: RRR.   No m/r/g. No carotid bruits .      Lungs:   CTAB, no w/r/r, no respiratory distress.  Abdomen: Soft, NT/ND + BS; no suprapubic tenderness; no masses or hepatosplenomegaly.  Extremities:  2+ DP and radial pulses bilaterally.  No c/c/e.   Skin:  Warm, dry.  Slight erythema on the right side of the face towards neck.  Neurologic: Alert & oriented x 3. No focal deficits.  Psychiatric:  Affect normal, mood normal, judgment normal.    LABS     None.    IMAGING     None    ASSESMENT AND PLAN        1. Skin infection  - mupirocin (BACTROBAN) 2 % Ointment; Apply 1 Application to affected area(s) every day.  Dispense: 1 Tube; Refill: 3    2. Chronic seasonal allergic rhinitis due to pollen  - triamcinolone acetonide (KENALOG-40) injection 40 mg; 1 mL by Intramuscular route Once.  Advised to continue OTC antihistamine.    3. Obesity (BMI 30.0-34.9)  - Patient identified as having weight management issue.  Appropriate orders and counseling given.    Counseling:   - Smoking:  Nonsmoker    Followup: Return if symptoms worsen or fail to improve.    All questions are answered.    Please note that this dictation was created using voice recognition software, and that there might be errors of melecio and possibly content.

## 2018-07-11 ENCOUNTER — HOSPITAL ENCOUNTER (OUTPATIENT)
Dept: LAB | Facility: MEDICAL CENTER | Age: 38
End: 2018-07-11
Attending: FAMILY MEDICINE
Payer: COMMERCIAL

## 2018-07-11 DIAGNOSIS — R73.03 PREDIABETES: ICD-10-CM

## 2018-07-11 DIAGNOSIS — E78.2 MIXED DYSLIPIDEMIA: ICD-10-CM

## 2018-07-11 LAB
CHOLEST SERPL-MCNC: 187 MG/DL (ref 100–199)
EST. AVERAGE GLUCOSE BLD GHB EST-MCNC: 111 MG/DL
HBA1C MFR BLD: 5.5 % (ref 0–5.6)
HDLC SERPL-MCNC: 41 MG/DL
LDLC SERPL CALC-MCNC: 118 MG/DL
TRIGL SERPL-MCNC: 139 MG/DL (ref 0–149)

## 2018-07-11 PROCEDURE — 80061 LIPID PANEL: CPT

## 2018-07-11 PROCEDURE — 83036 HEMOGLOBIN GLYCOSYLATED A1C: CPT

## 2018-07-11 PROCEDURE — 36415 COLL VENOUS BLD VENIPUNCTURE: CPT

## 2018-07-12 ENCOUNTER — OFFICE VISIT (OUTPATIENT)
Dept: MEDICAL GROUP | Facility: MEDICAL CENTER | Age: 38
End: 2018-07-12
Payer: COMMERCIAL

## 2018-07-12 VITALS
TEMPERATURE: 98.9 F | OXYGEN SATURATION: 99 % | HEART RATE: 72 BPM | BODY MASS INDEX: 33.56 KG/M2 | HEIGHT: 67 IN | WEIGHT: 213.85 LBS | SYSTOLIC BLOOD PRESSURE: 108 MMHG | DIASTOLIC BLOOD PRESSURE: 74 MMHG

## 2018-07-12 DIAGNOSIS — K64.8 OTHER HEMORRHOIDS: ICD-10-CM

## 2018-07-12 DIAGNOSIS — M25.512 ACUTE PAIN OF LEFT SHOULDER: ICD-10-CM

## 2018-07-12 DIAGNOSIS — M77.9 TENDONITIS: ICD-10-CM

## 2018-07-12 DIAGNOSIS — D22.9 NEVUS: ICD-10-CM

## 2018-07-12 DIAGNOSIS — Z00.00 HEALTH CARE MAINTENANCE: ICD-10-CM

## 2018-07-12 DIAGNOSIS — Z12.83 SKIN CANCER SCREENING: ICD-10-CM

## 2018-07-12 PROBLEM — M75.81 RIGHT ROTATOR CUFF TENDINITIS: Status: RESOLVED | Noted: 2017-10-16 | Resolved: 2018-07-12

## 2018-07-12 PROBLEM — R26.89 BALANCE PROBLEM: Status: RESOLVED | Noted: 2017-01-23 | Resolved: 2018-07-12

## 2018-07-12 PROCEDURE — 99214 OFFICE O/P EST MOD 30 MIN: CPT | Performed by: INTERNAL MEDICINE

## 2018-07-12 RX ORDER — MELOXICAM 15 MG/1
15 TABLET ORAL DAILY
Qty: 60 TAB | Refills: 0 | Status: SHIPPED | OUTPATIENT
Start: 2018-07-12 | End: 2018-09-06 | Stop reason: SDUPTHER

## 2018-07-12 RX ORDER — HYDROCORTISONE ACETATE 25 MG/1
25 SUPPOSITORY RECTAL EVERY 12 HOURS
Qty: 30 SUPPOSITORY | Refills: 1 | Status: SHIPPED | OUTPATIENT
Start: 2018-07-12 | End: 2018-08-06 | Stop reason: SDUPTHER

## 2018-07-12 NOTE — PROGRESS NOTES
CHIEF COMPLAINT  Chief Complaint   Patient presents with   • Hemorrhoids   • Referral Needed     to allergist     HPI  Patient is a 38 y.o. male patient who presents today for the following     Hemorrhoids  38 year old, male, with history of constipation  Onset: 1-2 months ago.   C/o itching, irritation, hematochezia.   Improved with:  - over-the-counter topical medications  - increased fiber/fluid intake, that resolved his constipation.    Left shoulder pain, tendonitis  - Onset: 3 weeks ago  - Triger: sports  - located in: Left lateral shoulder  - intensity:  Mild,  occasionally moderate  - quality:  dull, with TTP  - radiation:  no  - alleviating factors are:  rest  -  exacerbating factors are:  activity  - accompanied:    - no numbness, weakness, tingling, fever, chills  - course: Improving  - therapy: none    Nevus  The patient has had few moles on the back and requested dermatology referral.  No significant sun exposure in the past.  No PMH/FH of skin cancer.     Reviewed PMH, PSH, FH, SH, ALL, HCM/IMM, no changes  Reviewed MEDS, updated    Patient Active Problem List    Diagnosis Date Noted   • Staphylococcal infection of skin 01/30/2018   • Right rotator cuff tendinitis 10/16/2017   • Obesity (BMI 30.0-34.9) 10/16/2017   • Prediabetes 09/17/2017   • Mixed dyslipidemia 09/17/2017   • Balance problem 01/23/2017   • Anxiety 01/23/2017     CURRENT MEDICATIONS  Current Outpatient Prescriptions   Medication Sig Dispense Refill   • mupirocin (BACTROBAN) 2 % Ointment Apply 1 Application to affected area(s) every day. 1 Tube 3   • valacyclovir (VALTREX) 1 GM Tab Take 1 Tab by mouth 2 times a day. 20 Tab 2   • diazepam (VALIUM) 2 MG Tab        No current facility-administered medications for this visit.      ALLERGIES  Allergies: Other environmental  PAST MEDICAL HISTORY  Past Medical History:   Diagnosis Date   • Rotator cuff tendonitis      SURGICAL HISTORY  He  has no past surgical history on file.  SOCIAL  "HISTORY  Social History   Substance Use Topics   • Smoking status: Former Smoker     Packs/day: 0.25     Years: 7.00     Types: Cigarettes     Quit date: 2016   • Smokeless tobacco: Never Used   • Alcohol use No     Social History     Social History Narrative   • No narrative on file     FAMILY HISTORY  Family History   Problem Relation Age of Onset   • Other Mother      Meniere's Disease   • Alzheimer's Disease Mother    • Cancer Mother      Skin Cancer   • Allergies Father    • No Known Problems Sister    • Cancer Maternal Grandmother      Throat CA   • Alcohol/Drug Maternal Grandmother      Smoker/Drinker   • Cancer Maternal Grandfather    • Alcohol/Drug Maternal Grandfather      Smoker/Drinker   • Alcohol/Drug Paternal Grandmother      Smoker/Drinker   • Cancer Paternal Grandfather      Stomach Cancer   • Alcohol/Drug Paternal Grandfather      Smoker/Drinker     Family Status   Relation Status   • Mother  at age 61    Alzheimer's   • Father Alive   • Sister Alive   • Maternal Grandmother    • Maternal Grandfather    • Paternal Grandmother    • Paternal Grandfather      ROS   Constitutional: Negative for fever, chills.  HENT: Negative for congestion, sore throat.  Eyes: Negative for blurred vision.   Respiratory: Negative for cough, shortness of breath.  Cardiovascular: Negative for chest pain, palpitations.   Gastrointestinal: Negative for heartburn, nausea, abdominal pain. And per HPI.  Genitourinary: Negative for urinary problems.  Musculoskeletal: As above.  Skin: Negative for rash and itching. And per HPI.  Neuro: Negative for dizziness, weakness and headaches.   Endo/Heme/Allergies: Does not bruise/bleed easily.   Psychiatric/Behavioral: Negative for depression.    PHYSICAL EXAM   Blood pressure 108/74, pulse 72, temperature 37.2 °C (98.9 °F), height 1.702 m (5' 7\"), weight 97 kg (213 lb 13.5 oz), SpO2 99 %. Body mass index is 33.49 kg/m².  General:  NAD, well " appearing  HEENT:   NC/AT, PERRLA, EOMI, TMs are clear. Oropharyngeal mucosa is pink,  without lesions;  no cervical / supraclavicular  lymphadenopathy, no thyromegaly.    Cardiovascular: RRR.   No m/r/g. No carotid bruits .      Lungs:   CTAB, no w/r/r, no respiratory distress.  Abdomen: Soft, NT/ND + BS; no suprapubic tenderness; no masses or hepatosplenomegaly.  Extremities:  2+ DP and radial pulses bilaterally.  No c/c/e. TTP over the left shoulder lateral tendons.  Skin:  Warm, dry.  No erythema. No rash.  Few benign-appearing moles on the back.  Neurologic: Alert & oriented x 3. No focal deficits.  Psychiatric:  Affect normal, mood normal, judgment normal.    LABS     Labs are reviewed and discussed with a patient  Lab Results   Component Value Date/Time    CHOLSTRLTOT 187 07/11/2018 07:27 AM     (H) 07/11/2018 07:27 AM    HDL 41 07/11/2018 07:27 AM    TRIGLYCERIDE 139 07/11/2018 07:27 AM       Lab Results   Component Value Date/Time    SODIUM 138 09/13/2017 03:58 PM    POTASSIUM 4.2 09/13/2017 03:58 PM    CHLORIDE 105 09/13/2017 03:58 PM    CO2 27 09/13/2017 03:58 PM    GLUCOSE 81 09/13/2017 03:58 PM    BUN 11 09/13/2017 03:58 PM    CREATININE 0.96 09/13/2017 03:58 PM     Lab Results   Component Value Date/Time    ALKPHOSPHAT 65 09/13/2017 03:58 PM    ASTSGOT 18 09/13/2017 03:58 PM    ALTSGPT 21 09/13/2017 03:58 PM    TBILIRUBIN 1.7 (H) 09/13/2017 03:58 PM      Lab Results   Component Value Date/Time    HBA1C 5.5 07/11/2018 07:27 AM    HBA1C 5.8 (H) 09/13/2017 03:58 PM     IMAGING     None    ASSESMENT AND PLAN        1. Other hemorrhoids  Advised to continue good fluid and fiber intake, to have daily soft stools.    - hydrocortisone (ANUSOL-HC) 25 MG Suppos; Insert 1 Suppository in rectum every 12 hours.  Dispense: 30 Suppository; Refill: 1  - hydrocortisone rectal (PROCTOZONE HC) 2.5 % Cream; Apply BID over affected area prn  Dispense: 30 g; Refill: 0    2. Acute pain of left shoulder  - meloxicam  (MOBIC) 15 MG tablet; Take 1 Tab by mouth every day.  Dispense: 60 Tab; Refill: 0  3. Tendonitis  - meloxicam (MOBIC) 15 MG tablet; Take 1 Tab by mouth every day.  Dispense: 60 Tab; Refill: 0  Advised initial rest for a week, with ice packs and continue afterwards activity as tolerated.     4. Nevus  Benign appearing  - REFERRAL TO DERMATOLOGY    5. Skin cancer screening  - REFERRAL TO DERMATOLOGY    6. Health care maintenance  Advised: TDAP    Counseling:   - Smoking:  Nonsmoker    Followup: Return if symptoms worsen or fail to improve.    All questions are answered.    Please note that this dictation was created using voice recognition software, and that there might be errors of melecio and possibly content.

## 2018-08-06 DIAGNOSIS — K64.8 OTHER HEMORRHOIDS: ICD-10-CM

## 2018-08-06 RX ORDER — HYDROCORTISONE ACETATE 25 MG/1
25 SUPPOSITORY RECTAL EVERY 12 HOURS
Qty: 30 SUPPOSITORY | Refills: 2 | Status: SHIPPED | OUTPATIENT
Start: 2018-08-06 | End: 2019-06-28

## 2018-09-06 DIAGNOSIS — M77.9 TENDONITIS: ICD-10-CM

## 2018-09-06 DIAGNOSIS — M25.512 ACUTE PAIN OF LEFT SHOULDER: ICD-10-CM

## 2018-09-06 RX ORDER — MELOXICAM 15 MG/1
TABLET ORAL
Qty: 60 TAB | Refills: 1 | Status: SHIPPED | OUTPATIENT
Start: 2018-09-06 | End: 2019-06-28

## 2019-05-30 ENCOUNTER — APPOINTMENT (RX ONLY)
Dept: URBAN - METROPOLITAN AREA CLINIC 4 | Facility: CLINIC | Age: 39
Setting detail: DERMATOLOGY
End: 2019-05-30

## 2019-05-30 DIAGNOSIS — L81.4 OTHER MELANIN HYPERPIGMENTATION: ICD-10-CM

## 2019-05-30 DIAGNOSIS — D22 MELANOCYTIC NEVI: ICD-10-CM

## 2019-05-30 DIAGNOSIS — L57.8 OTHER SKIN CHANGES DUE TO CHRONIC EXPOSURE TO NONIONIZING RADIATION: ICD-10-CM

## 2019-05-30 DIAGNOSIS — D18.0 HEMANGIOMA: ICD-10-CM

## 2019-05-30 PROBLEM — D18.01 HEMANGIOMA OF SKIN AND SUBCUTANEOUS TISSUE: Status: ACTIVE | Noted: 2019-05-30

## 2019-05-30 PROBLEM — D22.5 MELANOCYTIC NEVI OF TRUNK: Status: ACTIVE | Noted: 2019-05-30

## 2019-05-30 PROBLEM — D48.5 NEOPLASM OF UNCERTAIN BEHAVIOR OF SKIN: Status: ACTIVE | Noted: 2019-05-30

## 2019-05-30 PROCEDURE — 11102 TANGNTL BX SKIN SINGLE LES: CPT

## 2019-05-30 PROCEDURE — ? BIOPSY BY SHAVE METHOD

## 2019-05-30 PROCEDURE — ? COUNSELING

## 2019-05-30 PROCEDURE — 99203 OFFICE O/P NEW LOW 30 MIN: CPT | Mod: 25

## 2019-05-30 ASSESSMENT — LOCATION DETAILED DESCRIPTION DERM
LOCATION DETAILED: RIGHT PROXIMAL DORSAL FOREARM
LOCATION DETAILED: RIGHT ANTERIOR DISTAL THIGH
LOCATION DETAILED: LEFT PROXIMAL DORSAL FOREARM
LOCATION DETAILED: LEFT MEDIAL SUPERIOR CHEST
LOCATION DETAILED: RIGHT ANTERIOR PROXIMAL UPPER ARM
LOCATION DETAILED: LEFT ANTERIOR PROXIMAL UPPER ARM
LOCATION DETAILED: RIGHT SUPERIOR MEDIAL UPPER BACK
LOCATION DETAILED: LEFT ANTERIOR DISTAL THIGH
LOCATION DETAILED: RIGHT CENTRAL MALAR CHEEK
LOCATION DETAILED: RIGHT SUPERIOR UPPER BACK
LOCATION DETAILED: LEFT INFERIOR CENTRAL MALAR CHEEK
LOCATION DETAILED: RIGHT MID-UPPER BACK

## 2019-05-30 ASSESSMENT — LOCATION SIMPLE DESCRIPTION DERM
LOCATION SIMPLE: LEFT THIGH
LOCATION SIMPLE: RIGHT FOREARM
LOCATION SIMPLE: RIGHT CHEEK
LOCATION SIMPLE: RIGHT UPPER ARM
LOCATION SIMPLE: LEFT FOREARM
LOCATION SIMPLE: LEFT CHEEK
LOCATION SIMPLE: RIGHT THIGH
LOCATION SIMPLE: RIGHT UPPER BACK
LOCATION SIMPLE: CHEST
LOCATION SIMPLE: LEFT UPPER ARM

## 2019-05-30 ASSESSMENT — LOCATION ZONE DERM
LOCATION ZONE: ARM
LOCATION ZONE: FACE
LOCATION ZONE: TRUNK
LOCATION ZONE: LEG

## 2019-05-30 NOTE — PROCEDURE: BIOPSY BY SHAVE METHOD
Consent: Written consent was obtained and risks were reviewed including but not limited to scarring, infection, bleeding, scabbing, incomplete removal, nerve damage and allergy to anesthesia.
Destruction After The Procedure: No
Lab: [id number]
Notification Instructions: Patient will be notified of biopsy results. However, patient instructed to call the office if not contacted within 2 weeks.
Curettage Text: The wound bed was treated with curettage after the biopsy was performed.
Depth Of Biopsy: dermis
Wound Care: Petrolatum
Lab Facility: 
Cryotherapy Text: The wound bed was treated with cryotherapy after the biopsy was performed.
Additional Anesthesia Volume In Cc (Will Not Render If 0): 0
Anesthesia Type: 1% lidocaine with epinephrine
Size Of Lesion In Cm: 0.8
Billing Type: Third-Party Bill
Type Of Destruction Used: Curettage
Anesthesia Volume In Cc: 0.5
Electrodesiccation Text: The wound bed was treated with electrodesiccation after the biopsy was performed.
Biopsy Type: H and E
Was A Bandage Applied: Yes
Hemostasis: Drysol
Silver Nitrate Text: The wound bed was treated with silver nitrate after the biopsy was performed.
Biopsy Method: Dermablade
Detail Level: Detailed
Electrodesiccation And Curettage Text: The wound bed was treated with electrodesiccation and curettage after the biopsy was performed.
Post-Care Instructions: I reviewed with the patient in detail post-care instructions. Patient is to keep the biopsy site dry overnight, and then apply bacitracin twice daily until healed. Patient may apply hydrogen peroxide soaks to remove any crusting.
Dressing: bandage

## 2019-06-28 ENCOUNTER — APPOINTMENT (OUTPATIENT)
Dept: MEDICAL GROUP | Facility: MEDICAL CENTER | Age: 39
End: 2019-06-28
Payer: COMMERCIAL

## 2019-06-28 ENCOUNTER — APPOINTMENT (OUTPATIENT)
Dept: RADIOLOGY | Facility: MEDICAL CENTER | Age: 39
End: 2019-06-28
Attending: EMERGENCY MEDICINE
Payer: COMMERCIAL

## 2019-06-28 ENCOUNTER — HOSPITAL ENCOUNTER (EMERGENCY)
Facility: MEDICAL CENTER | Age: 39
End: 2019-06-28
Attending: EMERGENCY MEDICINE
Payer: COMMERCIAL

## 2019-06-28 VITALS
HEIGHT: 69 IN | RESPIRATION RATE: 18 BRPM | DIASTOLIC BLOOD PRESSURE: 74 MMHG | WEIGHT: 211.2 LBS | HEART RATE: 82 BPM | TEMPERATURE: 97 F | OXYGEN SATURATION: 98 % | BODY MASS INDEX: 31.28 KG/M2 | SYSTOLIC BLOOD PRESSURE: 122 MMHG

## 2019-06-28 DIAGNOSIS — R07.89 CHEST WALL PAIN: ICD-10-CM

## 2019-06-28 DIAGNOSIS — R10.9 LEFT FLANK PAIN: ICD-10-CM

## 2019-06-28 LAB
APPEARANCE UR: CLEAR
BILIRUB UR QL STRIP.AUTO: NEGATIVE
COLOR UR: YELLOW
D DIMER PPP IA.FEU-MCNC: 0.42 UG/ML (FEU) (ref 0–0.5)
GLUCOSE UR STRIP.AUTO-MCNC: NEGATIVE MG/DL
KETONES UR STRIP.AUTO-MCNC: NEGATIVE MG/DL
LEUKOCYTE ESTERASE UR QL STRIP.AUTO: NEGATIVE
MICRO URNS: NORMAL
NITRITE UR QL STRIP.AUTO: NEGATIVE
PH UR STRIP.AUTO: 5 [PH]
PROT UR QL STRIP: NEGATIVE MG/DL
RBC UR QL AUTO: NEGATIVE
SP GR UR STRIP.AUTO: 1.01
UROBILINOGEN UR STRIP.AUTO-MCNC: 0.2 MG/DL

## 2019-06-28 PROCEDURE — 71101 X-RAY EXAM UNILAT RIBS/CHEST: CPT | Mod: LT

## 2019-06-28 PROCEDURE — 85379 FIBRIN DEGRADATION QUANT: CPT

## 2019-06-28 PROCEDURE — 99284 EMERGENCY DEPT VISIT MOD MDM: CPT

## 2019-06-28 PROCEDURE — 81003 URINALYSIS AUTO W/O SCOPE: CPT

## 2019-06-28 RX ORDER — DIAZEPAM 2 MG/1
2 TABLET ORAL EVERY 6 HOURS PRN
COMMUNITY
End: 2021-03-06

## 2019-06-28 ASSESSMENT — PAIN DESCRIPTION - DESCRIPTORS: DESCRIPTORS: SORE

## 2019-06-28 NOTE — ED TRIAGE NOTES
.  Chief Complaint   Patient presents with   • Rib Pain     left rib pain chronic worsening.    ambulated to triage with above c/c. Reports 10 yr old injury was never seen for. Pain worsening and more constant.

## 2019-06-28 NOTE — ED PROVIDER NOTES
ED Provider Note    CHIEF COMPLAINT  Chief Complaint   Patient presents with   • Rib Pain     left rib pain chronic worsening.        HPI  Iggy Malloy is a 39 y.o. male who presents exacerbation of left flank pain, something he states he initially injured 10 years ago while swinging a softball bat.  It was a twist of the torso causing the initial injury.  Intermittent with the pain will flareup, it is now worse than it is ever been over the past week.  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  He has some pain with deep breath, some pain with movement.  No history of DVT, no leg swelling.  No cough or hemoptysis, patient is requesting a x-ray, work-up for the left flank and chest wall pain.    REVIEW OF SYSTEMS    Constitutional: No fever  Respiratory: Pleuritic left-sided chest pain  Cardiac: No syncope, no exertional chest pain  Gastrointestinal: No abdominal pain  Musculoskeletal: Left flank pain  Neurologic: No headache  Genitourinary: No dysuria, no hematuria       All other systems are negative.       PAST MEDICAL HISTORY  Past Medical History:   Diagnosis Date   • Rotator cuff tendonitis        FAMILY HISTORY  Family History   Problem Relation Age of Onset   • Other Mother         Meniere's Disease   • Alzheimer's Disease Mother    • Cancer Mother         Skin Cancer   • Allergies Father    • No Known Problems Sister    • Cancer Maternal Grandmother         Throat CA   • Alcohol/Drug Maternal Grandmother         Smoker/Drinker   • Cancer Maternal Grandfather    • Alcohol/Drug Maternal Grandfather         Smoker/Drinker   • Alcohol/Drug Paternal Grandmother         Smoker/Drinker   • Cancer Paternal Grandfather         Stomach Cancer   • Alcohol/Drug Paternal Grandfather         Smoker/Drinker       SOCIAL HISTORY  Social History     Social History   • Marital status: Single     Spouse name: N/A   • Number of children: N/A   • Years of education: N/A  "    Social History Main Topics   • Smoking status: Former Smoker     Packs/day: 0.25     Years: 7.00     Types: Cigarettes     Quit date: 1/23/2016   • Smokeless tobacco: Never Used   • Alcohol use No   • Drug use: Yes     Types: Marijuana      Comment: Marijuana nightly for anxiety/sleep   • Sexual activity: Yes     Partners: Female     Other Topics Concern   • Not on file     Social History Narrative   • No narrative on file       SURGICAL HISTORY  History reviewed. No pertinent surgical history.    CURRENT MEDICATIONS  Home Medications     Reviewed by Ila Babin R.N. (Registered Nurse) on 06/28/19 at 0822  Med List Status: Complete   Medication Last Dose Status   diazePAM (VALIUM) 2 MG Tab prn Active                ALLERGIES  Allergies   Allergen Reactions   • Other Environmental Runny Nose     severe       PHYSICAL EXAM  VITAL SIGNS: /74   Pulse 82   Temp 36.1 °C (97 °F) (Temporal)   Resp 18   Ht 1.753 m (5' 9\")   Wt 95.8 kg (211 lb 3.2 oz)   SpO2 98%   BMI 31.19 kg/m²   Constitutional:  Non-toxic appearance.   HENT: No facial swelling  Eyes: Anicteric, no conjunctivitis.     Cardiovascular: Normal heart rate, Normal rhythm  Pulmonary:  No wheezing, No rales.    Gastrointestinal: Soft, No tenderness, No masses  Skin: Warm, Dry, No cyanosis.  No overlying swelling redness or shingles to the left flank, no bruising  Neurologic: Speech is clear, follows commands, facial expression is symmetrical.  Psychiatric: Affect normal,  Mood normal.  Patient is calm and cooperative  Musculoskeletal: Left flank tenderness, left lateral lower rib tenderness.  No crepitance or deformity.  No midline spinal tenderness    EKG/Labs  Results for orders placed or performed during the hospital encounter of 06/28/19   URINALYSIS,CULTURE IF INDICATED   Result Value Ref Range    Color Yellow     Character Clear     Specific Gravity 1.006 <1.035    Ph 5.0 5.0 - 8.0    Glucose Negative Negative mg/dL    Ketones " Negative Negative mg/dL    Protein Negative Negative mg/dL    Bilirubin Negative Negative    Urobilinogen, Urine 0.2 Negative    Nitrite Negative Negative    Leukocyte Esterase Negative Negative    Occult Blood Negative Negative    Micro Urine Req see below    D-DIMER   Result Value Ref Range    D-Dimer Screen 0.42 0.00 - 0.50 ug/mL (FEU)         RADIOLOGY/PROCEDURES  [id number] (WITH 1-VIEW CXR) LEFT   Final Result      Negative left rib series.            COURSE & MEDICAL DECISION MAKING  Pertinent Labs & Imaging studies reviewed. (See chart for details)  Patient with exacerbation of chronic pain left flank and chest wall, unknown reason for recent worsening.  Patient does need active lifestyle including training and wrestling he states.  No evidence of pulmonary embolism as he has a negative d-dimer.  There is no evidence of blood in the urine or.  Chest x-ray negative and rib x-ray negative.  Patient advised likely muscular skeletal in origin, to rest the area and follow-up with his primary doctor for recheck.    FINAL IMPRESSION     1. Chest wall pain    2. Left flank pain                    Electronically signed by: Jose Joe, 6/28/2019 1:40 PM

## 2021-03-06 ENCOUNTER — OFFICE VISIT (OUTPATIENT)
Dept: URGENT CARE | Facility: CLINIC | Age: 41
End: 2021-03-06
Payer: COMMERCIAL

## 2021-03-06 VITALS
RESPIRATION RATE: 16 BRPM | DIASTOLIC BLOOD PRESSURE: 70 MMHG | HEIGHT: 68 IN | BODY MASS INDEX: 33.31 KG/M2 | TEMPERATURE: 97.7 F | SYSTOLIC BLOOD PRESSURE: 110 MMHG | HEART RATE: 94 BPM | OXYGEN SATURATION: 95 % | WEIGHT: 219.8 LBS

## 2021-03-06 DIAGNOSIS — H57.89 IRRITATION OF RIGHT EYE: ICD-10-CM

## 2021-03-06 DIAGNOSIS — R51.9 NONINTRACTABLE HEADACHE, UNSPECIFIED CHRONICITY PATTERN, UNSPECIFIED HEADACHE TYPE: ICD-10-CM

## 2021-03-06 PROCEDURE — 99214 OFFICE O/P EST MOD 30 MIN: CPT | Performed by: NURSE PRACTITIONER

## 2021-03-06 RX ORDER — ERYTHROMYCIN 5 MG/G
1 OINTMENT OPHTHALMIC 4 TIMES DAILY
Qty: 1 G | Refills: 0 | Status: SHIPPED | OUTPATIENT
Start: 2021-03-06

## 2021-03-06 ASSESSMENT — ENCOUNTER SYMPTOMS
SORE THROAT: 0
EYE PAIN: 1
DOUBLE VISION: 0
HEADACHES: 1
VOMITING: 0
EYE REDNESS: 1
FOREIGN BODY SENSATION: 0
SHORTNESS OF BREATH: 0
NUMBNESS: 0
LOSS OF CONSCIOUSNESS: 0
FEVER: 0
LOSS OF BALANCE: 0
NAUSEA: 0
MYALGIAS: 0
BLURRED VISION: 0
EYE DISCHARGE: 0
CHILLS: 0
DIZZINESS: 0
ABNORMAL BEHAVIOR: 0
MIGRAINE HEADACHES: 0
PHOTOPHOBIA: 0
SEIZURES: 0
ABDOMINAL PAIN: 0

## 2021-03-06 ASSESSMENT — VISUAL ACUITY: OU: 1

## 2021-03-07 NOTE — PROGRESS NOTES
Subjective:   Iggy Malloy is a 40 y.o. male who presents for Eye Pain (irritation, redness x 3 days ) and Headache (patient got his covid shot this thrusday)      Eye Injury   The right eye is affected. This is a new problem. Episode onset: 30 days  The problem occurs constantly. The problem has been gradually worsening. There was no injury mechanism. The pain is at a severity of 3/10. The pain is mild. There is no known exposure to pink eye. He does not wear contacts. Associated symptoms include eye redness. Pertinent negatives include no blurred vision, eye discharge, double vision, fever, foreign body sensation, nausea, photophobia or vomiting. Associated symptoms comments: Pain with ocular movement  . He has tried eye drops for the symptoms. The treatment provided no relief.   Headache   This is a new problem. Episode onset: 3 days. The problem occurs constantly. The problem has been unchanged. The pain is located in the right unilateral region. The pain does not radiate. The pain quality is not similar to prior headaches. The quality of the pain is described as aching. The pain is at a severity of 4/10. The pain is mild. Associated symptoms include eye pain and eye redness. Pertinent negatives include no abdominal pain, abnormal behavior, blurred vision, dizziness, ear pain, fever, hearing loss, loss of balance, nausea, numbness, phonophobia, photophobia, seizures, sore throat or vomiting. Nothing aggravates the symptoms. Treatments tried: Tylenol. The treatment provided no relief. There is no history of hypertension, migraine headaches, migraines in the family or recent head traumas.       Review of Systems   Constitutional: Negative for chills and fever.   HENT: Negative for ear pain, hearing loss and sore throat.    Eyes: Positive for pain and redness. Negative for blurred vision, double vision, photophobia and discharge.   Respiratory: Negative for shortness of breath.    Cardiovascular: Negative  "for chest pain.   Gastrointestinal: Negative for abdominal pain, nausea and vomiting.   Genitourinary: Negative for dysuria and hematuria.   Musculoskeletal: Negative for myalgias.   Skin: Negative for rash.   Neurological: Positive for headaches. Negative for dizziness, seizures, loss of consciousness, numbness and loss of balance.       Medications:    • diazePAM Tabs    Allergies: Other environmental    Problem List: Iggy Malloy has Anxiety; Prediabetes; Mixed dyslipidemia; Obesity (BMI 30.0-34.9); Staphylococcal infection of skin; and Health care maintenance on their problem list.    Surgical History:  No past surgical history on file.    Past Social Hx: Iggy Malloy  reports that he quit smoking about 5 years ago. His smoking use included cigarettes. He has a 1.75 pack-year smoking history. He has never used smokeless tobacco. He reports current drug use. Drug: Marijuana. He reports that he does not drink alcohol.     Past Family Hx:  Iggy Malloy family history includes Alcohol/Drug in his maternal grandfather, maternal grandmother, paternal grandfather, and paternal grandmother; Allergies in his father; Alzheimer's Disease in his mother; Cancer in his maternal grandfather, maternal grandmother, mother, and paternal grandfather; No Known Problems in his sister; Other in his mother.     Problem list, medications, and allergies reviewed by myself today in Epic.     Objective:     /70 (BP Location: Right arm, Patient Position: Sitting, BP Cuff Size: Adult)   Pulse 94   Temp 36.5 °C (97.7 °F) (Temporal)   Resp 16   Ht 1.727 m (5' 8\")   Wt 99.7 kg (219 lb 12.8 oz)   SpO2 95%   BMI 33.42 kg/m²     Physical Exam  Constitutional:       Appearance: Normal appearance. He is not ill-appearing or toxic-appearing.   HENT:      Head: Normocephalic.      Right Ear: External ear normal.      Left Ear: External ear normal.      Nose: Nose normal.      Mouth/Throat:      Lips: Pink.      " Mouth: Mucous membranes are moist.   Eyes:      General: Lids are normal. Lids are everted, no foreign bodies appreciated. Vision grossly intact.         Right eye: No foreign body or discharge.         Left eye: No discharge.      Conjunctiva/sclera:      Right eye: Right conjunctiva is injected. No exudate or hemorrhage.     Pupils: Pupils are equal, round, and reactive to light.      Right eye: Fluorescein uptake present. Lurdes exam negative.      Slit lamp exam:     Right eye: No photophobia.   Pulmonary:      Effort: Pulmonary effort is normal. No accessory muscle usage or respiratory distress.   Musculoskeletal:         General: Normal range of motion.      Cervical back: Full passive range of motion without pain.   Skin:     Coloration: Skin is not pale.   Neurological:      Mental Status: He is alert and oriented to person, place, and time.   Psychiatric:         Mood and Affect: Mood normal.         Thought Content: Thought content normal.     Visual acuity  Right eye 20/40  Left eye: 20/20  Both eyes:  20/20        Assessment/Plan:     Diagnosis and associated orders:     1. Irritation of right eye  erythromycin 5 MG/GM Ointment   2. Nonintractable headache, unspecified chronicity pattern, unspecified headache type          Comments/MDM:     • Patient is a 40-year-old male present with the stated above, patient having decreased vision acuity of the right, eye redness with pressure and pain with ocular movement.  Consulted Dr. Smith ophthalmologist with  [facility] in regards to patient's presentation.  Was advised to start patient on antibiotic ointment.  Patient will be seen in clinic Monday morning for complete evaluation by Dr. Smith .   • Differential diagnosis, natural history, supportive care, and indications for immediate follow-up discussed.            Please note that this dictation was created using voice recognition software. I have made a reasonable attempt to correct obvious errors, but I  expect that there are errors of grammar and possibly content that I did not discover before finalizing the note.    This note was electronically signed by Kwame HUDDLESTON.

## 2021-03-18 ENCOUNTER — TELEMEDICINE (OUTPATIENT)
Dept: TELEHEALTH | Facility: TELEMEDICINE | Age: 41
End: 2021-03-18
Payer: COMMERCIAL

## 2021-03-18 DIAGNOSIS — B35.4 TINEA CORPORIS: ICD-10-CM

## 2021-03-18 PROCEDURE — 99213 OFFICE O/P EST LOW 20 MIN: CPT | Mod: 95,CR | Performed by: PHYSICIAN ASSISTANT

## 2021-03-18 RX ORDER — CLOTRIMAZOLE AND BETAMETHASONE DIPROPIONATE 10; .64 MG/G; MG/G
CREAM TOPICAL
Qty: 45 G | Refills: 0 | Status: SHIPPED | OUTPATIENT
Start: 2021-03-18

## 2021-03-18 ASSESSMENT — ENCOUNTER SYMPTOMS
COUGH: 0
HEADACHES: 0
EYE DISCHARGE: 0
VOMITING: 0
FEVER: 0
SORE THROAT: 0
NAUSEA: 0
EYE REDNESS: 0
SHORTNESS OF BREATH: 0

## 2021-03-18 NOTE — PROGRESS NOTES
Telemedicine: Established Patient   This evaluation was conducted via Zoom using secure and encrypted videoconferencing technology. The patient was in a private location in the state of [place].    The patient's identity was confirmed and verbal consent was obtained for this virtual visit.    Subjective:   CC: possible ring worm to the left inner thigh x 10 days    Iggy Malloy is a 41 y.o. male presenting for evaluation and management of:    This is a new problem.   The patient presents via virtual visit secondary to possible ringworm to the left inner thigh x10 days.  The patient states he noticed an area of redness to the left inner thigh approximately 10 days ago.  The patient initially thought the redness was related to a mosquito bite.  The patient states the area of redness gradually progressed.  He describes the rash as a circular area of redness with raised borders and a central clearing.  The patient describes the rash as itchy.  The patient reports no associated pain/tenderness.  No swelling.  No increased warmth.  No discharge/drainage.  He also reports no associated fever.  The patient has applied OTC cortisone cream and Neosporin.  The patient reports no additional rashes/lesions to his body.  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  The patient states no one else in his household is experiencing a similar rash.    PMH:  has a past medical history of Rotator cuff tendonitis.  MEDS:   Current Outpatient Medications:   •  erythromycin 5 MG/GM Ointment, Apply 1 Application to both eyes 4 times a day., Disp: 1 g, Rfl: 0  ALLERGIES:   Allergies   Allergen Reactions   • Other Environmental Runny Nose     severe     SURGHX: No past surgical history on file.  SOCHX:  reports that he quit smoking about 5 years ago. His smoking use included cigarettes. He has a 1.75 pack-year smoking history. He has never used smokeless tobacco. He reports  current drug use. Drug: Marijuana. He reports that he does not drink alcohol.  FH: Family history was reviewed, no pertinent findings to report      Review of Systems   Constitutional: Negative for fever.   HENT: Negative for congestion, ear pain and sore throat.    Eyes: Negative for discharge and redness.   Respiratory: Negative for cough and shortness of breath.    Cardiovascular: Negative for chest pain and leg swelling.   Gastrointestinal: Negative for nausea and vomiting.   Musculoskeletal: Negative for joint pain.   Skin: Positive for rash (possible ring worm to the left inner thigh).   Neurological: Negative for headaches.              Objective:     Vitals obtained by patient: none as the patient was seen via virtual visit    Physical Exam:  Constitutional: Alert, no distress, well-groomed.  Skin: Well-demarcated circular area of erythema to the left thigh with raised borders and central clearing.  No visible discharge/weeping.  Eye: Round. Conjunctiva clear, lids normal. No icterus.   ENMT: Lips pink without lesions, good dentition, moist mucous membranes. Phonation normal.  Neck: No masses, no thyromegaly. Moves freely without pain.  CV: Pulse as reported by patient  Respiratory: Unlabored respiratory effort, no cough or audible wheeze  Psych: Alert and oriented x3, normal affect and mood.       Assessment and Plan:   The following treatment plan was discussed:     1. Tinea corporis  - clotrimazole-betamethasone (LOTRISONE) 1-0.05 % Cream; Apply a small amount to the affected area twice daily x 10 days.  Dispense: 45 g; Refill: 0    Differential diagnoses, supportive care, and indications for immediate follow-up discussed with patient.   Instructed to return to clinic or nearest emergency department for any change in condition, further concerns, or worsening of symptoms.    OTC Tylenol or Motrin for fever/discomfort.  OTC anti-itch cream for symptomatic relief  Monitor for secondary signs of  infection  Follow-up with PCP as needed  Return to clinic or go to the ED if symptoms worsen or fail to improve, or if the patient should develop worsening/increasing/persistent rash, pain/tenderness the affected area, swelling, increased redness or warmth, discharge/weeping, fever/chills, secondary signs of infection, and/or any concerning symptoms.    Discussed plan with the patient, and he agrees to the above.    I personally reviewed prior external notes and test results pertinent to today's visit.  I have independently reviewed and interpreted all diagnostics ordered during this urgent care visit.     Time spent evaluating this patient was at least 30 minutes and includes preparing for visit, obtaining history, exam and evaluation, ordering labs/tests/procedures/medications, independent interpretation, and counseling/education.    Please note that this dictation was created using voice recognition software. I have made every reasonable attempt to correct obvious errors, but I expect that there may be errors of grammar and possibly content that I did not discover before finalizing the note.     This note was electronically signed by Osiris Reynolds PA-C       Follow-up: No follow-ups on file.

## 2021-04-07 ENCOUNTER — APPOINTMENT (RX ONLY)
Dept: URBAN - METROPOLITAN AREA CLINIC 22 | Facility: CLINIC | Age: 41
Setting detail: DERMATOLOGY
End: 2021-04-07

## 2021-04-07 DIAGNOSIS — Z71.89 OTHER SPECIFIED COUNSELING: ICD-10-CM

## 2021-04-07 DIAGNOSIS — L91.8 OTHER HYPERTROPHIC DISORDERS OF THE SKIN: ICD-10-CM

## 2021-04-07 DIAGNOSIS — D18.0 HEMANGIOMA: ICD-10-CM

## 2021-04-07 DIAGNOSIS — L81.4 OTHER MELANIN HYPERPIGMENTATION: ICD-10-CM

## 2021-04-07 DIAGNOSIS — D22 MELANOCYTIC NEVI: ICD-10-CM

## 2021-04-07 DIAGNOSIS — L57.3 POIKILODERMA OF CIVATTE: ICD-10-CM

## 2021-04-07 PROBLEM — D22.5 MELANOCYTIC NEVI OF TRUNK: Status: ACTIVE | Noted: 2021-04-07

## 2021-04-07 PROBLEM — D18.01 HEMANGIOMA OF SKIN AND SUBCUTANEOUS TISSUE: Status: ACTIVE | Noted: 2021-04-07

## 2021-04-07 PROBLEM — D48.5 NEOPLASM OF UNCERTAIN BEHAVIOR OF SKIN: Status: ACTIVE | Noted: 2021-04-07

## 2021-04-07 PROCEDURE — ? SUNSCREEN TREATMENT REGIMEN

## 2021-04-07 PROCEDURE — ? COUNSELING

## 2021-04-07 PROCEDURE — 99213 OFFICE O/P EST LOW 20 MIN: CPT

## 2021-04-07 PROCEDURE — ? PHOTO-DOCUMENTATION

## 2021-04-07 ASSESSMENT — LOCATION SIMPLE DESCRIPTION DERM
LOCATION SIMPLE: CHEST
LOCATION SIMPLE: LEFT FOREHEAD
LOCATION SIMPLE: RIGHT ANTERIOR NECK
LOCATION SIMPLE: RIGHT SUPERIOR EYELID
LOCATION SIMPLE: POSTERIOR SCALP
LOCATION SIMPLE: INFERIOR FOREHEAD
LOCATION SIMPLE: LEFT FOREARM
LOCATION SIMPLE: LEFT ANTERIOR NECK
LOCATION SIMPLE: UPPER BACK
LOCATION SIMPLE: RIGHT FOREARM

## 2021-04-07 ASSESSMENT — LOCATION ZONE DERM
LOCATION ZONE: EYELID
LOCATION ZONE: TRUNK
LOCATION ZONE: FACE
LOCATION ZONE: SCALP
LOCATION ZONE: NECK
LOCATION ZONE: ARM

## 2021-04-07 ASSESSMENT — LOCATION DETAILED DESCRIPTION DERM
LOCATION DETAILED: RIGHT CLAVICULAR NECK
LOCATION DETAILED: RIGHT VENTRAL PROXIMAL FOREARM
LOCATION DETAILED: POSTERIOR MID-PARIETAL SCALP
LOCATION DETAILED: LOWER STERNUM
LOCATION DETAILED: INFERIOR MID FOREHEAD
LOCATION DETAILED: SUPERIOR THORACIC SPINE
LOCATION DETAILED: LEFT SUPERIOR FOREHEAD
LOCATION DETAILED: LEFT INFERIOR LATERAL NECK
LOCATION DETAILED: RIGHT LATERAL SUPERIOR EYELID
LOCATION DETAILED: LEFT VENTRAL PROXIMAL FOREARM

## 2021-04-07 NOTE — HPI: SKIN LESION
Is This A New Presentation, Or A Follow-Up?: Mole
What Type Of Note Output Would You Prefer (Optional)?: Bullet Format
How Severe Is Your Skin Lesion?: moderate
Has Your Skin Lesion Been Treated?: not been treated
Which Family Member (Optional)?: Mother

## 2021-06-09 ENCOUNTER — APPOINTMENT (RX ONLY)
Dept: URBAN - METROPOLITAN AREA CLINIC 22 | Facility: CLINIC | Age: 41
Setting detail: DERMATOLOGY
End: 2021-06-09

## 2021-06-09 DIAGNOSIS — B35.4 TINEA CORPORIS: ICD-10-CM

## 2021-06-09 DIAGNOSIS — L30.9 DERMATITIS, UNSPECIFIED: ICD-10-CM

## 2021-06-09 PROBLEM — L08.9 LOCAL INFECTION OF THE SKIN AND SUBCUTANEOUS TISSUE, UNSPECIFIED: Status: ACTIVE | Noted: 2021-06-09

## 2021-06-09 PROCEDURE — ? KOH PREP

## 2021-06-09 PROCEDURE — ? BIOPSY BY SHAVE METHOD

## 2021-06-09 PROCEDURE — 11102 TANGNTL BX SKIN SINGLE LES: CPT

## 2021-06-09 ASSESSMENT — LOCATION SIMPLE DESCRIPTION DERM: LOCATION SIMPLE: LEFT THIGH

## 2021-06-09 ASSESSMENT — LOCATION ZONE DERM: LOCATION ZONE: LEG

## 2021-06-09 ASSESSMENT — LOCATION DETAILED DESCRIPTION DERM: LOCATION DETAILED: LEFT ANTERIOR DISTAL THIGH

## 2021-06-09 NOTE — HPI: RASH
What Type Of Note Output Would You Prefer (Optional)?: Standard Output
How Severe Is Your Rash?: moderate
Is This A New Presentation, Or A Follow-Up?: Rash
Additional History: Currently on the following clotrimazole and betamethasone dipropionate cream.\\nRash comes and goes.

## 2021-06-16 ENCOUNTER — RX ONLY (OUTPATIENT)
Age: 41
Setting detail: RX ONLY
End: 2021-06-16

## 2021-06-16 RX ORDER — ECONAZOLE NITRATE 10 MG/G
CREAM TOPICAL
Qty: 1 | Refills: 1 | Status: ERX | COMMUNITY
Start: 2021-06-16

## 2022-04-03 NOTE — ASSESSMENT & PLAN NOTE
Uncontrolled, however patient has significant muscle mass. Patient and I discussed that he could likely still losing additional 10-15 pounds, and patient is in agreement.    ROS is NEGATIVE for blurred vision, polydipsia, polyuria, diaphoresis, palpitations, fatigue, irritability, flank pain, BLE paresthesias.    ROS is NEGATIVE for: cold or heat intolerance, anxiety/depression, chest pain/pressure, palpitations, hair thickening/coarsening/falling out/thinning, skin changes, diarrhea/constipation, unexpected weight change.   Yayo Gallegos MD (renal) Yayo Gallegos MD (renal)  Yayo Gallegos MD (renal) [phone]  Colby Mazariegos MD (cardiology)

## 2022-04-06 ENCOUNTER — APPOINTMENT (RX ONLY)
Dept: URBAN - METROPOLITAN AREA CLINIC 22 | Facility: CLINIC | Age: 42
Setting detail: DERMATOLOGY
End: 2022-04-06

## 2022-04-06 DIAGNOSIS — L81.4 OTHER MELANIN HYPERPIGMENTATION: ICD-10-CM

## 2022-04-06 DIAGNOSIS — D18.0 HEMANGIOMA: ICD-10-CM

## 2022-04-06 DIAGNOSIS — Z71.89 OTHER SPECIFIED COUNSELING: ICD-10-CM

## 2022-04-06 DIAGNOSIS — L91.8 OTHER HYPERTROPHIC DISORDERS OF THE SKIN: ICD-10-CM

## 2022-04-06 DIAGNOSIS — D22 MELANOCYTIC NEVI: ICD-10-CM

## 2022-04-06 DIAGNOSIS — L82.1 OTHER SEBORRHEIC KERATOSIS: ICD-10-CM

## 2022-04-06 DIAGNOSIS — D17 BENIGN LIPOMATOUS NEOPLASM: ICD-10-CM

## 2022-04-06 PROBLEM — D18.01 HEMANGIOMA OF SKIN AND SUBCUTANEOUS TISSUE: Status: ACTIVE | Noted: 2022-04-06

## 2022-04-06 PROBLEM — D22.5 MELANOCYTIC NEVI OF TRUNK: Status: ACTIVE | Noted: 2022-04-06

## 2022-04-06 PROBLEM — D17.1 BENIGN LIPOMATOUS NEOPLASM OF SKIN AND SUBCUTANEOUS TISSUE OF TRUNK: Status: ACTIVE | Noted: 2022-04-06

## 2022-04-06 PROCEDURE — ? COUNSELING

## 2022-04-06 PROCEDURE — ? SUNSCREEN TREATMENT REGIMEN

## 2022-04-06 PROCEDURE — 99213 OFFICE O/P EST LOW 20 MIN: CPT

## 2022-04-06 ASSESSMENT — LOCATION SIMPLE DESCRIPTION DERM
LOCATION SIMPLE: ABDOMEN
LOCATION SIMPLE: CHEST
LOCATION SIMPLE: LEFT UPPER BACK
LOCATION SIMPLE: CHEST
LOCATION SIMPLE: UPPER BACK
LOCATION SIMPLE: LEFT FOREHEAD
LOCATION SIMPLE: LEFT LOWER BACK
LOCATION SIMPLE: POSTERIOR SCALP
LOCATION SIMPLE: ABDOMEN
LOCATION SIMPLE: RIGHT FOREHEAD

## 2022-04-06 ASSESSMENT — LOCATION DETAILED DESCRIPTION DERM
LOCATION DETAILED: LEFT SUPERIOR LATERAL MIDBACK
LOCATION DETAILED: SUPERIOR THORACIC SPINE
LOCATION DETAILED: LEFT RIB CAGE
LOCATION DETAILED: LEFT MID-UPPER BACK
LOCATION DETAILED: LEFT SUPERIOR FOREHEAD
LOCATION DETAILED: LEFT LATERAL INFERIOR CHEST
LOCATION DETAILED: LEFT SUPERIOR LATERAL LOWER BACK
LOCATION DETAILED: RIGHT SUPERIOR FOREHEAD
LOCATION DETAILED: PERIUMBILICAL SKIN
LOCATION DETAILED: LEFT RIB CAGE
LOCATION DETAILED: LEFT LATERAL INFERIOR CHEST
LOCATION DETAILED: LEFT INFERIOR LATERAL MIDBACK
LOCATION DETAILED: POSTERIOR MID-PARIETAL SCALP

## 2022-04-06 ASSESSMENT — LOCATION ZONE DERM
LOCATION ZONE: FACE
LOCATION ZONE: SCALP
LOCATION ZONE: TRUNK
LOCATION ZONE: TRUNK

## 2022-04-06 NOTE — HPI: EVALUATION OF SKIN LESION(S)
What Type Of Note Output Would You Prefer (Optional)?: Standard Output
How Severe Are Your Spot(S)?: moderate
Have Your Spot(S) Been Treated In The Past?: has not been treated
Hpi Title: Evaluation of Skin Lesions
Additional History: He’s has bumps under the skin that he went to the ER about 2yrs ago and they did X-rays and labs but nothing abnormal showed

## 2023-04-06 ENCOUNTER — APPOINTMENT (RX ONLY)
Dept: URBAN - METROPOLITAN AREA CLINIC 15 | Facility: CLINIC | Age: 43
Setting detail: DERMATOLOGY
End: 2023-04-06

## 2023-04-06 DIAGNOSIS — L82.1 OTHER SEBORRHEIC KERATOSIS: ICD-10-CM

## 2023-04-06 DIAGNOSIS — D18.0 HEMANGIOMA: ICD-10-CM

## 2023-04-06 DIAGNOSIS — L81.4 OTHER MELANIN HYPERPIGMENTATION: ICD-10-CM

## 2023-04-06 DIAGNOSIS — D22 MELANOCYTIC NEVI: ICD-10-CM

## 2023-04-06 DIAGNOSIS — Z71.89 OTHER SPECIFIED COUNSELING: ICD-10-CM

## 2023-04-06 PROBLEM — D18.01 HEMANGIOMA OF SKIN AND SUBCUTANEOUS TISSUE: Status: ACTIVE | Noted: 2023-04-06

## 2023-04-06 PROBLEM — D22.5 MELANOCYTIC NEVI OF TRUNK: Status: ACTIVE | Noted: 2023-04-06

## 2023-04-06 PROCEDURE — ? COUNSELING

## 2023-04-06 PROCEDURE — ? SUNSCREEN TREATMENT REGIMEN

## 2023-04-06 PROCEDURE — 99213 OFFICE O/P EST LOW 20 MIN: CPT

## 2023-04-06 ASSESSMENT — LOCATION DETAILED DESCRIPTION DERM
LOCATION DETAILED: INFERIOR MID FOREHEAD
LOCATION DETAILED: INFERIOR THORACIC SPINE
LOCATION DETAILED: LEFT VENTRAL PROXIMAL FOREARM
LOCATION DETAILED: RIGHT VENTRAL PROXIMAL FOREARM
LOCATION DETAILED: SUPERIOR THORACIC SPINE
LOCATION DETAILED: LEFT CENTRAL FRONTAL SCALP
LOCATION DETAILED: LEFT INFERIOR CENTRAL MALAR CHEEK
LOCATION DETAILED: MID-FRONTAL SCALP

## 2023-04-06 ASSESSMENT — LOCATION SIMPLE DESCRIPTION DERM
LOCATION SIMPLE: LEFT CHEEK
LOCATION SIMPLE: ANTERIOR SCALP
LOCATION SIMPLE: UPPER BACK
LOCATION SIMPLE: LEFT FOREARM
LOCATION SIMPLE: INFERIOR FOREHEAD
LOCATION SIMPLE: RIGHT FOREARM
LOCATION SIMPLE: SCALP

## 2023-04-06 ASSESSMENT — LOCATION ZONE DERM
LOCATION ZONE: FACE
LOCATION ZONE: SCALP
LOCATION ZONE: ARM
LOCATION ZONE: TRUNK

## 2023-09-27 ENCOUNTER — APPOINTMENT (RX ONLY)
Dept: URBAN - METROPOLITAN AREA CLINIC 22 | Facility: CLINIC | Age: 43
Setting detail: DERMATOLOGY
End: 2023-09-27

## 2023-09-27 DIAGNOSIS — D18.0 HEMANGIOMA: ICD-10-CM

## 2023-09-27 PROBLEM — D18.01 HEMANGIOMA OF SKIN AND SUBCUTANEOUS TISSUE: Status: ACTIVE | Noted: 2023-09-27

## 2023-09-27 PROCEDURE — 99212 OFFICE O/P EST SF 10 MIN: CPT

## 2023-09-27 PROCEDURE — ? COUNSELING

## 2023-09-27 ASSESSMENT — LOCATION ZONE DERM: LOCATION ZONE: SCALP

## 2023-09-27 ASSESSMENT — LOCATION DETAILED DESCRIPTION DERM: LOCATION DETAILED: POSTERIOR MID-PARIETAL SCALP

## 2023-09-27 ASSESSMENT — LOCATION SIMPLE DESCRIPTION DERM: LOCATION SIMPLE: POSTERIOR SCALP

## 2024-04-11 ENCOUNTER — APPOINTMENT (RX ONLY)
Dept: URBAN - METROPOLITAN AREA CLINIC 22 | Facility: CLINIC | Age: 44
Setting detail: DERMATOLOGY
End: 2024-04-11

## 2024-04-11 DIAGNOSIS — Z71.89 OTHER SPECIFIED COUNSELING: ICD-10-CM

## 2024-04-11 DIAGNOSIS — L82.1 OTHER SEBORRHEIC KERATOSIS: ICD-10-CM

## 2024-04-11 DIAGNOSIS — L81.4 OTHER MELANIN HYPERPIGMENTATION: ICD-10-CM

## 2024-04-11 DIAGNOSIS — D22 MELANOCYTIC NEVI: ICD-10-CM

## 2024-04-11 DIAGNOSIS — D18.0 HEMANGIOMA: ICD-10-CM

## 2024-04-11 PROBLEM — D18.01 HEMANGIOMA OF SKIN AND SUBCUTANEOUS TISSUE: Status: ACTIVE | Noted: 2024-04-11

## 2024-04-11 PROBLEM — D22.5 MELANOCYTIC NEVI OF TRUNK: Status: ACTIVE | Noted: 2024-04-11

## 2024-04-11 PROBLEM — D22.72 MELANOCYTIC NEVI OF LEFT LOWER LIMB, INCLUDING HIP: Status: ACTIVE | Noted: 2024-04-11

## 2024-04-11 PROBLEM — D22.4 MELANOCYTIC NEVI OF SCALP AND NECK: Status: ACTIVE | Noted: 2024-04-11

## 2024-04-11 PROCEDURE — 99213 OFFICE O/P EST LOW 20 MIN: CPT

## 2024-04-11 PROCEDURE — ? SUNSCREEN TREATMENT REGIMEN

## 2024-04-11 PROCEDURE — ? COUNSELING

## 2024-04-11 ASSESSMENT — LOCATION DETAILED DESCRIPTION DERM
LOCATION DETAILED: RIGHT INFERIOR OCCIPITAL SCALP
LOCATION DETAILED: MID-FRONTAL SCALP
LOCATION DETAILED: LEFT VENTRAL PROXIMAL FOREARM
LOCATION DETAILED: LEFT OCCIPITAL SCALP
LOCATION DETAILED: RIGHT SUPERIOR MEDIAL MIDBACK
LOCATION DETAILED: INFERIOR MID FOREHEAD
LOCATION DETAILED: RIGHT NASAL SIDEWALL
LOCATION DETAILED: LEFT SUPERIOR UPPER BACK
LOCATION DETAILED: LEFT INFERIOR CENTRAL MALAR CHEEK
LOCATION DETAILED: LEFT CENTRAL FRONTAL SCALP
LOCATION DETAILED: SUPERIOR THORACIC SPINE
LOCATION DETAILED: INFERIOR THORACIC SPINE
LOCATION DETAILED: LEFT PROXIMAL POSTERIOR THIGH
LOCATION DETAILED: RIGHT SUPERIOR OCCIPITAL SCALP
LOCATION DETAILED: POSTERIOR MID-PARIETAL SCALP
LOCATION DETAILED: LEFT INFERIOR MEDIAL MIDBACK
LOCATION DETAILED: RIGHT VENTRAL PROXIMAL FOREARM

## 2024-04-11 ASSESSMENT — LOCATION SIMPLE DESCRIPTION DERM
LOCATION SIMPLE: POSTERIOR SCALP
LOCATION SIMPLE: LEFT UPPER BACK
LOCATION SIMPLE: RIGHT NOSE
LOCATION SIMPLE: LEFT FOREARM
LOCATION SIMPLE: LEFT CHEEK
LOCATION SIMPLE: RIGHT FOREARM
LOCATION SIMPLE: ANTERIOR SCALP
LOCATION SIMPLE: UPPER BACK
LOCATION SIMPLE: RIGHT LOWER BACK
LOCATION SIMPLE: SCALP
LOCATION SIMPLE: LEFT LOWER BACK
LOCATION SIMPLE: INFERIOR FOREHEAD
LOCATION SIMPLE: LEFT POSTERIOR THIGH

## 2024-04-11 ASSESSMENT — LOCATION ZONE DERM
LOCATION ZONE: SCALP
LOCATION ZONE: NOSE
LOCATION ZONE: ARM
LOCATION ZONE: LEG
LOCATION ZONE: FACE
LOCATION ZONE: TRUNK

## 2024-09-08 ENCOUNTER — OFFICE VISIT (OUTPATIENT)
Dept: URGENT CARE | Facility: CLINIC | Age: 44
End: 2024-09-08
Payer: COMMERCIAL

## 2024-09-08 VITALS
RESPIRATION RATE: 18 BRPM | OXYGEN SATURATION: 97 % | DIASTOLIC BLOOD PRESSURE: 82 MMHG | TEMPERATURE: 97.3 F | HEART RATE: 86 BPM | HEIGHT: 69 IN | BODY MASS INDEX: 33.83 KG/M2 | SYSTOLIC BLOOD PRESSURE: 118 MMHG | WEIGHT: 228.4 LBS

## 2024-09-08 DIAGNOSIS — K12.2 ORAL CELLULITIS: ICD-10-CM

## 2024-09-08 LAB — S PYO DNA SPEC NAA+PROBE: NOT DETECTED

## 2024-09-08 PROCEDURE — 3074F SYST BP LT 130 MM HG: CPT | Performed by: PHYSICIAN ASSISTANT

## 2024-09-08 PROCEDURE — 99204 OFFICE O/P NEW MOD 45 MIN: CPT | Performed by: PHYSICIAN ASSISTANT

## 2024-09-08 PROCEDURE — 3079F DIAST BP 80-89 MM HG: CPT | Performed by: PHYSICIAN ASSISTANT

## 2024-09-08 PROCEDURE — 87651 STREP A DNA AMP PROBE: CPT | Performed by: PHYSICIAN ASSISTANT

## 2024-09-09 ASSESSMENT — ENCOUNTER SYMPTOMS
HEADACHES: 1
TROUBLE SWALLOWING: 0
SWOLLEN GLANDS: 1
COUGH: 0

## 2024-09-09 NOTE — PROGRESS NOTES
Subjective:   Iggy Malloy is a 44 y.o. male who presents for Pharyngitis (Head ache x 3 days)        Pharyngitis   This is a new problem. Episode onset: 3 days. The problem has been gradually worsening. The pain is worse on the right side. There has been no fever. Associated symptoms include headaches and swollen glands. Pertinent negatives include no congestion, coughing, drooling or trouble swallowing. Associated symptoms comments: Pain with swallowing. He has had no exposure to strep or mono. He has tried acetaminophen for the symptoms. The treatment provided mild relief.     Review of Systems   HENT:  Negative for congestion, drooling and trouble swallowing.    Respiratory:  Negative for cough.    Neurological:  Positive for headaches.       PMH:  has a past medical history of Rotator cuff tendonitis.  MEDS:   Current Outpatient Medications:     amoxicillin-clavulanate (AUGMENTIN) 875-125 MG Tab, Take 1 Tablet by mouth 2 times a day for 7 days., Disp: 14 Tablet, Rfl: 0    clotrimazole-betamethasone (LOTRISONE) 1-0.05 % Cream, Apply a small amount to the affected area twice daily x 10 days. (Patient not taking: Reported on 9/8/2024), Disp: 45 g, Rfl: 0    erythromycin 5 MG/GM Ointment, Apply 1 Application to both eyes 4 times a day. (Patient not taking: Reported on 9/8/2024), Disp: 1 g, Rfl: 0  ALLERGIES:   Allergies   Allergen Reactions    Other Environmental Runny Nose     severe     SURGHX: History reviewed. No pertinent surgical history.  SOCHX:  reports that he quit smoking about 8 years ago. His smoking use included cigarettes. He started smoking about 15 years ago. He has a 1.8 pack-year smoking history. He has never used smokeless tobacco. He reports current alcohol use of about 1.2 oz of alcohol per week. He reports that he does not currently use drugs after having used the following drugs: Marijuana.  FH: Family history was reviewed, no pertinent findings to report   Objective:   /82    "Pulse 86   Temp 36.3 °C (97.3 °F) (Temporal)   Resp 18   Ht 1.753 m (5' 9\")   Wt 104 kg (228 lb 6.4 oz)   SpO2 97%   BMI 33.73 kg/m²   Physical Exam  Vitals reviewed.   Constitutional:       General: He is not in acute distress.     Appearance: Normal appearance. He is well-developed. He is not toxic-appearing.   HENT:      Head: Normocephalic and atraumatic.      Right Ear: External ear normal.      Left Ear: External ear normal.      Nose: Nose normal. No congestion or rhinorrhea.      Mouth/Throat:      Lips: Pink.      Mouth: Mucous membranes are moist.      Pharynx: Oropharynx is clear. Uvula midline. Posterior oropharyngeal erythema present. No oropharyngeal exudate or uvula swelling.      Tonsils: No tonsillar exudate. 2+ on the right. 1+ on the left.   Cardiovascular:      Rate and Rhythm: Normal rate and regular rhythm.   Pulmonary:      Effort: Pulmonary effort is normal. No respiratory distress.      Breath sounds: No stridor.   Lymphadenopathy:      Cervical: Cervical adenopathy present.      Right cervical: Superficial cervical adenopathy present.      Left cervical: No superficial cervical adenopathy.   Skin:     General: Skin is dry.   Neurological:      Comments: Alert and oriented.    Psychiatric:         Speech: Speech normal.         Behavior: Behavior normal.           Results for orders placed or performed in visit on 09/08/24   POCT GROUP A STREP, PCR   Result Value Ref Range    POC Group A Strep, PCR Not Detected Not Detected, Invalid       Assessment/Plan:   1. Oral cellulitis  - POCT GROUP A STREP, PCR  - amoxicillin-clavulanate (AUGMENTIN) 875-125 MG Tab; Take 1 Tablet by mouth 2 times a day for 7 days.  Dispense: 14 Tablet; Refill: 0    Considerations include but not limited to: GAS, infectious mononucleosis, other bacterial pharyngitis, viral uri, GERD, allergic pharyngitis. No evidence of peritonsillar abscess, retropharyngeal abscess of other deep space infection on exam " today.     Testing for group A strep is negative.  Unilateral nature of symptoms is concerning for oral cellulitis.  Recommend that patient closely monitor symptoms for the next 12 hours.  If his symptoms persist or worsen I would like him to begin the antibiotic as directed.    Salt water gargles, hot tea with honey, lozenges and ibuprofen as needed for pain.      If symptoms fail to improve within 48 hours, new symptoms develop, symptoms worsen see primary care provider or return to clinic for reevaluation.    If patient develops severe symptoms such as drooling/difficulty swallowing, difficulty opening their mouth, facial/neck redness or swelling, audible stridor or wheezing, difficulty moving their neck or other severe and concerning symptoms-I recommend that they go to the emergency room for further evaluation and management.

## 2024-11-04 ENCOUNTER — APPOINTMENT (RX ONLY)
Dept: URBAN - METROPOLITAN AREA CLINIC 22 | Facility: CLINIC | Age: 44
Setting detail: DERMATOLOGY
End: 2024-11-04

## 2024-11-04 DIAGNOSIS — L70.8 OTHER ACNE: ICD-10-CM

## 2024-11-04 DIAGNOSIS — D22 MELANOCYTIC NEVI: ICD-10-CM

## 2024-11-04 PROBLEM — D22.61 MELANOCYTIC NEVI OF RIGHT UPPER LIMB, INCLUDING SHOULDER: Status: ACTIVE | Noted: 2024-11-04

## 2024-11-04 PROCEDURE — 99213 OFFICE O/P EST LOW 20 MIN: CPT

## 2024-11-04 PROCEDURE — ? COUNSELING

## 2024-11-04 PROCEDURE — ? OBSERVATION

## 2024-11-04 PROCEDURE — ? TREATMENT REGIMEN

## 2024-11-04 PROCEDURE — ? PRESCRIPTION SAMPLES PROVIDED

## 2024-11-04 ASSESSMENT — LOCATION ZONE DERM
LOCATION ZONE: TRUNK
LOCATION ZONE: FINGER

## 2024-11-04 ASSESSMENT — LOCATION DETAILED DESCRIPTION DERM
LOCATION DETAILED: LEFT MEDIAL UPPER BACK
LOCATION DETAILED: RIGHT DISTAL PALMAR RING FINGER

## 2024-11-04 ASSESSMENT — LOCATION SIMPLE DESCRIPTION DERM
LOCATION SIMPLE: LEFT UPPER BACK
LOCATION SIMPLE: RIGHT RING FINGER

## 2025-04-18 ENCOUNTER — APPOINTMENT (OUTPATIENT)
Dept: URBAN - METROPOLITAN AREA CLINIC 22 | Facility: CLINIC | Age: 45
Setting detail: DERMATOLOGY
End: 2025-04-18

## 2025-04-18 DIAGNOSIS — D18.0 HEMANGIOMA: ICD-10-CM

## 2025-04-18 DIAGNOSIS — L70.8 OTHER ACNE: ICD-10-CM

## 2025-04-18 DIAGNOSIS — L81.4 OTHER MELANIN HYPERPIGMENTATION: ICD-10-CM

## 2025-04-18 DIAGNOSIS — Z71.89 OTHER SPECIFIED COUNSELING: ICD-10-CM

## 2025-04-18 DIAGNOSIS — D22 MELANOCYTIC NEVI: ICD-10-CM

## 2025-04-18 DIAGNOSIS — L82.1 OTHER SEBORRHEIC KERATOSIS: ICD-10-CM

## 2025-04-18 PROBLEM — D22.5 MELANOCYTIC NEVI OF TRUNK: Status: ACTIVE | Noted: 2025-04-18

## 2025-04-18 PROBLEM — D22.61 MELANOCYTIC NEVI OF RIGHT UPPER LIMB, INCLUDING SHOULDER: Status: ACTIVE | Noted: 2025-04-18

## 2025-04-18 PROBLEM — D18.01 HEMANGIOMA OF SKIN AND SUBCUTANEOUS TISSUE: Status: ACTIVE | Noted: 2025-04-18

## 2025-04-18 PROBLEM — D22.4 MELANOCYTIC NEVI OF SCALP AND NECK: Status: ACTIVE | Noted: 2025-04-18

## 2025-04-18 PROBLEM — D22.72 MELANOCYTIC NEVI OF LEFT LOWER LIMB, INCLUDING HIP: Status: ACTIVE | Noted: 2025-04-18

## 2025-04-18 PROCEDURE — ? PRESCRIPTION SAMPLES PROVIDED

## 2025-04-18 PROCEDURE — ? OBSERVATION

## 2025-04-18 PROCEDURE — ? SUNSCREEN TREATMENT REGIMEN

## 2025-04-18 PROCEDURE — 99213 OFFICE O/P EST LOW 20 MIN: CPT

## 2025-04-18 PROCEDURE — ? TREATMENT REGIMEN

## 2025-04-18 PROCEDURE — ? COUNSELING

## 2025-04-18 ASSESSMENT — LOCATION DETAILED DESCRIPTION DERM
LOCATION DETAILED: SUPERIOR THORACIC SPINE
LOCATION DETAILED: RIGHT NASAL SIDEWALL
LOCATION DETAILED: RIGHT VENTRAL PROXIMAL FOREARM
LOCATION DETAILED: RIGHT INFERIOR OCCIPITAL SCALP
LOCATION DETAILED: LEFT MEDIAL UPPER BACK
LOCATION DETAILED: RIGHT DISTAL PALMAR RING FINGER
LOCATION DETAILED: INFERIOR MID FOREHEAD
LOCATION DETAILED: LEFT VENTRAL PROXIMAL FOREARM
LOCATION DETAILED: RIGHT LATERAL ABDOMEN
LOCATION DETAILED: LEFT OCCIPITAL SCALP
LOCATION DETAILED: LEFT PROXIMAL POSTERIOR THIGH
LOCATION DETAILED: INFERIOR THORACIC SPINE
LOCATION DETAILED: RIGHT SUPERIOR OCCIPITAL SCALP
LOCATION DETAILED: MID-FRONTAL SCALP
LOCATION DETAILED: RIGHT SUPERIOR MEDIAL MIDBACK
LOCATION DETAILED: LEFT CENTRAL FRONTAL SCALP
LOCATION DETAILED: POSTERIOR MID-PARIETAL SCALP
LOCATION DETAILED: LEFT INFERIOR CENTRAL MALAR CHEEK
LOCATION DETAILED: LEFT SUPERIOR UPPER BACK
LOCATION DETAILED: LEFT INFERIOR MEDIAL MIDBACK
LOCATION DETAILED: RIGHT DISTAL PRETIBIAL REGION

## 2025-04-18 ASSESSMENT — LOCATION SIMPLE DESCRIPTION DERM
LOCATION SIMPLE: LEFT FOREARM
LOCATION SIMPLE: POSTERIOR SCALP
LOCATION SIMPLE: RIGHT PRETIBIAL REGION
LOCATION SIMPLE: LEFT LOWER BACK
LOCATION SIMPLE: SCALP
LOCATION SIMPLE: UPPER BACK
LOCATION SIMPLE: RIGHT LOWER BACK
LOCATION SIMPLE: ANTERIOR SCALP
LOCATION SIMPLE: LEFT POSTERIOR THIGH
LOCATION SIMPLE: LEFT CHEEK
LOCATION SIMPLE: RIGHT RING FINGER
LOCATION SIMPLE: INFERIOR FOREHEAD
LOCATION SIMPLE: LEFT UPPER BACK
LOCATION SIMPLE: ABDOMEN
LOCATION SIMPLE: RIGHT FOREARM
LOCATION SIMPLE: RIGHT NOSE

## 2025-04-18 ASSESSMENT — LOCATION ZONE DERM
LOCATION ZONE: LEG
LOCATION ZONE: ARM
LOCATION ZONE: FINGER
LOCATION ZONE: FACE
LOCATION ZONE: SCALP
LOCATION ZONE: TRUNK
LOCATION ZONE: NOSE